# Patient Record
Sex: MALE | Race: WHITE | NOT HISPANIC OR LATINO
[De-identification: names, ages, dates, MRNs, and addresses within clinical notes are randomized per-mention and may not be internally consistent; named-entity substitution may affect disease eponyms.]

---

## 2017-03-10 ENCOUNTER — APPOINTMENT (OUTPATIENT)
Dept: SPEECH THERAPY | Facility: CLINIC | Age: 32
End: 2017-03-10

## 2017-03-10 ENCOUNTER — OUTPATIENT (OUTPATIENT)
Dept: OUTPATIENT SERVICES | Facility: HOSPITAL | Age: 32
LOS: 1 days | Discharge: HOME | End: 2017-03-10

## 2017-03-15 DIAGNOSIS — F80.1 EXPRESSIVE LANGUAGE DISORDER: ICD-10-CM

## 2017-03-21 ENCOUNTER — MEDICATION RENEWAL (OUTPATIENT)
Age: 32
End: 2017-03-21

## 2017-05-30 ENCOUNTER — APPOINTMENT (OUTPATIENT)
Dept: OTOLARYNGOLOGY | Facility: CLINIC | Age: 32
End: 2017-05-30

## 2017-05-30 DIAGNOSIS — J30.81 ALLERGIC RHINITIS DUE TO ANIMAL (CAT) (DOG) HAIR AND DANDER: ICD-10-CM

## 2017-10-24 ENCOUNTER — OUTPATIENT (OUTPATIENT)
Dept: OUTPATIENT SERVICES | Age: 32
LOS: 1 days | Discharge: HOME | End: 2017-10-24

## 2017-10-30 ENCOUNTER — APPOINTMENT (OUTPATIENT)
Dept: PEDIATRIC CARDIOLOGY | Facility: CLINIC | Age: 32
End: 2017-10-30
Payer: MEDICARE

## 2017-10-30 VITALS
SYSTOLIC BLOOD PRESSURE: 118 MMHG | BODY MASS INDEX: 25.15 KG/M2 | RESPIRATION RATE: 18 BRPM | WEIGHT: 136.69 LBS | HEIGHT: 61.81 IN | OXYGEN SATURATION: 96 % | DIASTOLIC BLOOD PRESSURE: 57 MMHG | HEART RATE: 86 BPM

## 2017-10-30 PROCEDURE — 99215 OFFICE O/P EST HI 40 MIN: CPT | Mod: 25

## 2017-10-30 PROCEDURE — 93303 ECHO TRANSTHORACIC: CPT

## 2017-10-30 PROCEDURE — 93000 ELECTROCARDIOGRAM COMPLETE: CPT

## 2017-10-30 PROCEDURE — 93320 DOPPLER ECHO COMPLETE: CPT

## 2017-10-30 PROCEDURE — 93325 DOPPLER ECHO COLOR FLOW MAPG: CPT

## 2018-03-12 ENCOUNTER — APPOINTMENT (OUTPATIENT)
Dept: SPEECH THERAPY | Facility: CLINIC | Age: 33
End: 2018-03-12

## 2018-06-12 ENCOUNTER — APPOINTMENT (OUTPATIENT)
Dept: OTOLARYNGOLOGY | Facility: CLINIC | Age: 33
End: 2018-06-12
Payer: MEDICARE

## 2018-06-12 VITALS — WEIGHT: 136 LBS | HEIGHT: 61.81 IN | BODY MASS INDEX: 25.03 KG/M2

## 2018-06-12 PROCEDURE — 99213 OFFICE O/P EST LOW 20 MIN: CPT

## 2018-06-12 RX ORDER — QUETIAPINE 50 MG/1
50 TABLET, EXTENDED RELEASE ORAL
Refills: 0 | Status: ACTIVE | COMMUNITY

## 2018-09-28 ENCOUNTER — RX RENEWAL (OUTPATIENT)
Age: 33
End: 2018-09-28

## 2018-09-29 ENCOUNTER — RX RENEWAL (OUTPATIENT)
Age: 33
End: 2018-09-29

## 2018-11-22 ENCOUNTER — RESULT CHARGE (OUTPATIENT)
Age: 33
End: 2018-11-22

## 2018-11-23 ENCOUNTER — OUTPATIENT (OUTPATIENT)
Dept: OUTPATIENT SERVICES | Age: 33
LOS: 1 days | Discharge: HOME | End: 2018-11-23

## 2018-11-26 ENCOUNTER — APPOINTMENT (OUTPATIENT)
Dept: PEDIATRIC CARDIOLOGY | Facility: CLINIC | Age: 33
End: 2018-11-26
Payer: MEDICARE

## 2018-11-26 VITALS
DIASTOLIC BLOOD PRESSURE: 80 MMHG | WEIGHT: 134.48 LBS | OXYGEN SATURATION: 97 % | HEIGHT: 61.81 IN | HEART RATE: 95 BPM | BODY MASS INDEX: 24.75 KG/M2 | RESPIRATION RATE: 22 BRPM | SYSTOLIC BLOOD PRESSURE: 125 MMHG

## 2018-11-26 DIAGNOSIS — I37.1 NONRHEUMATIC PULMONARY VALVE INSUFFICIENCY: ICD-10-CM

## 2018-11-26 DIAGNOSIS — Q21.3 TETRALOGY OF FALLOT: ICD-10-CM

## 2018-11-26 PROCEDURE — 93320 DOPPLER ECHO COMPLETE: CPT

## 2018-11-26 PROCEDURE — 93303 ECHO TRANSTHORACIC: CPT

## 2018-11-26 PROCEDURE — 93325 DOPPLER ECHO COLOR FLOW MAPG: CPT

## 2018-11-26 PROCEDURE — 93000 ELECTROCARDIOGRAM COMPLETE: CPT

## 2018-11-26 PROCEDURE — 99215 OFFICE O/P EST HI 40 MIN: CPT | Mod: 25

## 2018-11-26 RX ORDER — TRAZODONE HYDROCHLORIDE 50 MG/1
50 TABLET ORAL
Refills: 0 | Status: ACTIVE | COMMUNITY
Start: 2018-11-26

## 2018-11-26 RX ORDER — MELATONIN 3 MG
3 CAPSULE ORAL
Refills: 0 | Status: COMPLETED | COMMUNITY
End: 2018-11-26

## 2018-11-30 NOTE — PHYSICAL EXAM
[General Appearance - Alert] : alert [General Appearance - In No Acute Distress] : in no acute distress [General Appearance - Well Nourished] : well nourished [General Appearance - Well-Appearing] : well appearing [Facies] : the head and face were normal in appearance [Down Syndrome] : Down Syndrome [Sclera] : the sclera were normal [Outer Ear] : the ears and nose were normal in appearance [Respiration, Rhythm And Depth] : normal respiratory rhythm and effort [Auscultation Breath Sounds / Voice Sounds] : breath sounds clear to auscultation bilaterally [Stridor] : no stridor was observed [Normal Chest Appearance] : the chest was normal in appearance [Chest Visual Inspection Thoracic Deformity] : no chest wall deformity [Chest Surgical / Traumatic Scar] : chest incision well healed [Apical Impulse] : quiet precordium with normal apical impulse [Heart Rate And Rhythm] : normal heart rate and rhythm [Systolic] : systolic [II] : a grade 2/6 [LMSB] : LMSB  [Back] : the murmur was transmitted to the back [I] : a grade 1/6  [Rumbling] : rumbling [[place]] : the murmur was transmitted to the apex [Abdomen Soft] : soft [Nondistended] : nondistended [Abdomen Tenderness] : non-tender [Nail Clubbing] : no clubbing  or cyanosis of the fingers [Musculoskeletal - Swelling] : no joint swelling or joint tenderness [Motor Tone] : muscle strength and tone were normal [] : no rash [Demonstrated Behavior - Infant Nonreactive To Parents] : interactive [Cooperative] : uncooperative

## 2018-11-30 NOTE — CARDIOLOGY SUMMARY
[de-identified] : 11/26/18 [FreeTextEntry1] : NSR, ventricular rate 83 bpm.RSR' suggestive of right ventricular conduction delay.   [de-identified] : 11/26/18 [FreeTextEntry2] : Summary:\par  1. Tetralogy of Fallot, status post closure of anterior malalignment ventricular septal defect and right ventricular outflow tract transannular patch.\par  2. Residual right ventricular outflow tract obstruction and mild in degree.\par  3. Mildly dilated right atrium.\par  4. Mild tricuspid valve regurgitation, peak systolic instantaneous gradient 47.1 mmHg.\par  5. Moderately dilated right ventricle.\par  6. Mild global hypokinesia of the right ventricle.\par  7. Paradoxical septal motion of interventricular septum.\par  8. Normal left ventricular morphology.\par  9. Qualitatively normal left ventricular systolic function.\par 10. No pericardial effusion.

## 2018-11-30 NOTE — REASON FOR VISIT
[Follow-Up] : a follow-up visit for [Tetralogy Of Fallot] : Tetralogy of Fallot [Other: _____] : [unfilled] [Foster Parents/Guardian] : /guardian [Medical Records] : medical records [FreeTextEntry1] : s/p transanular patch, trisomy 21 [FreeTextEntry3] : S/P TOF Repair, Pulmonary Regurgitation

## 2018-11-30 NOTE — CONSULT LETTER
[Today's Date] : [unfilled] [Name] : Name: [unfilled] [] : : ~~ [Today's Date:] : [unfilled] [Dear  ___:] : Dear Dr. [unfilled]: [Consult] : I had the pleasure of evaluating your patient, [unfilled]. My full evaluation follows. [Sincerely,] : Sincerely, [Consult - Multiple Provider] : Thank you very much for allowing us to participate in the care of this patient. If you have any questions, please do not hesitate to contact us. [FreeTextEntry4] : Dr. Dante Ram [FreeTextEntry5] : [address] [facility] [FreeTextEntry6] : [place]  [address] [de-identified] : Santino KIMBROUGH\par Fellow pediatric cardiology

## 2018-11-30 NOTE — REVIEW OF SYSTEMS
[Feeling Poorly] : not feeling poorly (malaise) [Fever] : no fever [Wgt Loss (___ Lbs)] : no recent weight loss [Nasal Stuffiness] : no nasal congestion [Sore Throat] : no sore throat [Earache] : no earache [Cyanosis] : no cyanosis [Edema] : no edema [Diaphoresis] : not diaphoretic [Chest Pain] : no chest pain or discomfort [Exercise Intolerance] : no persistence of exercise intolerance [Palpitations] : no palpitations [Orthopnea] : no orthopnea [Fast HR] : no tachycardia [Tachypnea] : not tachypneic [Wheezing] : no wheezing [Cough] : no cough [Shortness Of Breath] : not expressed as feeling short of breath [Vomiting] : no vomiting [Diarrhea] : no diarrhea [Abdominal Pain] : no abdominal pain [Decrease In Appetite] : appetite not decreased [Fainting (Syncope)] : no fainting [Seizure] : no seizures [Headache] : no headache [Dizziness] : no dizziness [Limping] : no limping [Joint Pains] : no arthralgias [Joint Swelling] : no joint swelling [Rash] : no rash [Wound problems] : no wound problems [Easy Bruising] : no tendency for easy bruising [Swollen Glands] : no lymphadenopathy [Easy Bleeding] : no ~M tendency for easy bleeding [Nosebleeds] : no epistaxis [Sleep Disturbances] : ~T no sleep disturbances [Failure To Thrive] : no failure to thrive [Short Stature] : short stature was not noted

## 2018-11-30 NOTE — DISCUSSION/SUMMARY
[Needs SBE Prophylaxis] : [unfilled]  needs bacterial endocarditis prophylaxis. SBE prophylaxis is indicated for dental and invasive ENT procedures. (Circulation. 2007; 116: 6531-2382) [May participate in all age-appropriate activities] : [unfilled] May participate in all age-appropriate activities. [Influenza vaccine is recommended] : Influenza vaccine is recommended [There are no changes in medication management.] : There are no changes in medication management [FreeTextEntry1] : Satish is a 33 year old young man with Trisomy 21, severe developmental and behavioral delay with tetralogy of Fallot who has severe pulmonary regurgitation and right ventricular dilation.\par \par He was somewhat cooperative today and we were able to obtain an echocardiogram at today's visit which is essentially unchanged with severe pulmonary regurgitation and dilated RV.\par \par As we have previously documented, at this point in time, though he meets criteria for a pulmonary valved conduit placement but we think putting him through surgery and recovery, frightening to a person without his disabilities, would be inappropriate.  He will not have improved ADL with the surgery and the risk involved is not balanced by the benefit.  Thus, our impression is unchanged from last year.\par \par Our recommendation is to keep his comfortable should his heart disease worsen and that may be accomplished with medications to improve function and manage fluid overloads if he develops congestive heart failure.\par \par We have instructed his caretaker to notify us should he have any change in his exercise tolerance, appear to become short of breath or have any syncopal episodes. We requested care taker to send us the labs from [facility]. We will continue to follow him annually with an echocardiogram every other year.\par

## 2018-11-30 NOTE — HISTORY OF PRESENT ILLNESS
[FreeTextEntry1] : Satish was seen today in the Adult Congenital Heart Disease Clinic of the [facility].  He is a 33 year old, twin , Trisomy 21 with tetralogy of Fallot who underwent a repair and now has severe pulmonary regurgitation with significant right ventricular dilation and MRI proven RV volume of 150 cc/m 2 in .  He is functionally unchanged, with no history of SOB/ROCA. He participates in OT and PT at his facility. His caretaker who accompanied him today reports that he is doing well and continues to participate in activities in his residence.\par \par He is very low functioning and non verbal here at today's visit. He is somewhat cooperative and non violent during his visits but will not tolerate much in terms of testing. He does have routine dental visits and recently had a cleaning.\par \par He resides in a facility and receives visits from his mother and brother every other weekend. His father is . He also has a sister with trisomy 21 who resides in a separate facility. The family is remains engaged in his health care.  He is here today with a counselor  from the facility he lives in and who knows him well.  He is described as sweet and somewhat communicative when not in the presence of doctors and also quite gentle.

## 2019-02-07 ENCOUNTER — APPOINTMENT (OUTPATIENT)
Dept: OTOLARYNGOLOGY | Facility: CLINIC | Age: 34
End: 2019-02-07
Payer: MEDICARE

## 2019-02-07 VITALS
SYSTOLIC BLOOD PRESSURE: 130 MMHG | WEIGHT: 135 LBS | HEART RATE: 88 BPM | BODY MASS INDEX: 24.84 KG/M2 | DIASTOLIC BLOOD PRESSURE: 74 MMHG | HEIGHT: 61.8 IN

## 2019-02-07 DIAGNOSIS — Z86.69 PERSONAL HISTORY OF OTHER DISEASES OF THE NERVOUS SYSTEM AND SENSE ORGANS: ICD-10-CM

## 2019-02-07 PROCEDURE — 99212 OFFICE O/P EST SF 10 MIN: CPT

## 2019-02-07 NOTE — PHYSICAL EXAM
[FreeTextEntry1] : uncooperative [Normal] : no mass and no adenopathy [de-identified] : no acute infection, narrow ear canals, tms only partially seen [de-identified] : dry and crusted anteriorly

## 2019-02-07 NOTE — REASON FOR VISIT
[Subsequent Evaluation] : a subsequent evaluation for [FreeTextEntry2] : 6 month f/u for nasal congestion

## 2019-02-07 NOTE — HISTORY OF PRESENT ILLNESS
[de-identified] : 33 year old male  follow up visit for annual exam, nasal congestion.   From group home.hx of previous tubes and perforation. uncooperative for exam

## 2019-08-02 ENCOUNTER — RX RENEWAL (OUTPATIENT)
Age: 34
End: 2019-08-02

## 2019-11-01 ENCOUNTER — OUTPATIENT (OUTPATIENT)
Dept: OUTPATIENT SERVICES | Age: 34
LOS: 1 days | Discharge: HOME | End: 2019-11-01

## 2019-11-04 ENCOUNTER — APPOINTMENT (OUTPATIENT)
Dept: PEDIATRIC CARDIOLOGY | Facility: CLINIC | Age: 34
End: 2019-11-04

## 2019-11-04 ENCOUNTER — APPOINTMENT (OUTPATIENT)
Age: 34
End: 2019-11-04

## 2019-11-06 ENCOUNTER — APPOINTMENT (OUTPATIENT)
Dept: PEDIATRIC CARDIOLOGY | Facility: CLINIC | Age: 34
End: 2019-11-06
Payer: MEDICARE

## 2019-11-06 VITALS
SYSTOLIC BLOOD PRESSURE: 112 MMHG | WEIGHT: 131.18 LBS | HEART RATE: 79 BPM | BODY MASS INDEX: 24.77 KG/M2 | DIASTOLIC BLOOD PRESSURE: 69 MMHG | OXYGEN SATURATION: 97 % | HEIGHT: 61.02 IN

## 2019-11-06 PROCEDURE — 93303 ECHO TRANSTHORACIC: CPT

## 2019-11-06 PROCEDURE — 93325 DOPPLER ECHO COLOR FLOW MAPG: CPT

## 2019-11-06 PROCEDURE — 93000 ELECTROCARDIOGRAM COMPLETE: CPT

## 2019-11-06 PROCEDURE — 99214 OFFICE O/P EST MOD 30 MIN: CPT | Mod: 25

## 2019-11-06 PROCEDURE — 93320 DOPPLER ECHO COMPLETE: CPT

## 2019-11-06 NOTE — REASON FOR VISIT
[Follow-Up] : a follow-up visit for [Tetralogy Of Fallot] : Tetralogy of Fallot [Pulmonary Valve Insufficiency] : pulmonary valve insufficiency [Foster Parents/Guardian] : /guardian [Medical Records] : medical records

## 2019-11-08 NOTE — DISCUSSION/SUMMARY
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  He has no functional limitations and by history, has not had any significant clinical change since his last evaluation last year.  I would agree that a referral for pulmonary valve replacement would not award Satish with any significant improvement in quality of life.  As discussed previously, I think a more appropriate course would be medical management of ventricular dysfunction and/or arrhythmias.  SBE prophylaxis is reasonable given his delay and challenges with excellent dental hygiene. \par \par Follow up in one year, sooner if any clinical concerns arise.

## 2019-11-08 NOTE — HISTORY OF PRESENT ILLNESS
[FreeTextEntry1] : I had the pleasure of seeing Satish Sanders in the Adult Congenital Heart Program of [facility] on November 6, 2019 for a follow up evaluation.  As you know, Satish is a 34 year old male with severe developmental delay related to Trisomy 21.  He was born with tetralogy of Fallot and underwent a transannular patch repair in infancy.  He has been followed serially for resultant free pulmonary insufficiency and right ventricular dilation.  A cardiac MRI from 2013 revealed an RVEF of 46% and RVEDVi of 150 ml/m2.  Intervention for pulmonary valve replacement has not been considered given the lack of symptoms and his considerable delay.  He was last seen by my partner, Dr. Hallman, last year at which time he was doing well.\par \par In the interim, Satish continues to do well.  Two aides from his home joined him for the visit today.  They state that Satish has had no change in energy level or level of participation in program.  He goes to program 5 days per week and goes home with his family every other weekend.  He has not had episodes of fast or heavy breathing, fast heart rate, near syncope, or syncope.

## 2019-11-08 NOTE — PHYSICAL EXAM
[General Appearance - Alert] : alert [General Appearance - In No Acute Distress] : in no acute distress [Down Syndrome] : Down Syndrome [Sclera] : the conjunctiva were normal [Examination Of The Oral Cavity] : mucous membranes were moist and pink [Respiration, Rhythm And Depth] : normal respiratory rhythm and effort [Auscultation Breath Sounds / Voice Sounds] : breath sounds clear to auscultation bilaterally [No Cough] : no cough [Sternotomy] : sternotomy [Well-Healed] : well-healed [Apical Impulse] : quiet precordium with normal apical impulse [Heart Rate And Rhythm] : normal heart rate and rhythm [Heart Sounds] : normal S1 and S2 [Heart Sounds Gallop] : no gallops [Heart Sounds Pericardial Friction Rub] : no pericardial rub [Arterial Pulses] : normal upper and lower extremity pulses with no pulse delay [Edema] : no edema [Capillary Refill Test] : normal capillary refill [Systolic] : systolic [II] : a grade 2/6 [LUSB] : LUSB [Ejection] : ejection [Diastolic] : diastolic [I] : a grade 1/4  [LMSB] : LMSB  [Abdomen Soft] : soft [Nondistended] : nondistended [Abdomen Tenderness] : non-tender [] : no hepato-splenomegaly [Musculoskeletal - Swelling] : no joint swelling seen [Nail Clubbing] : no clubbing  or cyanosis of the fingers [Skin Color & Pigmentation] : normal skin color and pigmentation [Demonstrated Behavior - Infant Nonreactive To Parents] : interactive [Mood] : mood and affect were appropriate for age [FreeTextEntry1] : rocking in seat, making noises

## 2019-11-08 NOTE — CONSULT LETTER
[Today's Date] : [unfilled] [Name] : Name: [unfilled] [] : : ~~ [Today's Date:] : [unfilled] [Dear  ___:] : Dear Dr. [unfilled]: [Consult] : I had the pleasure of evaluating your patient, [unfilled]. My full evaluation follows. [Consult - Multiple Provider] : Thank you very much for allowing us to participate in the care of this patient. If you have any questions, please do not hesitate to contact us. [Sincerely,] : Sincerely, [FreeTextEntry4] : Dr. Dante Ram [FreeTextEntry5] : [address] [facility] [FreeTextEntry6] : [place]  [address] [FreeTextEntry8] : [phone] [de-identified] : Lobo Monahan MD\par Pediatric Cardiology\par Adult Congenital Heart Disease\par  of Pediatrics\par [employer] [facility]

## 2019-11-08 NOTE — REVIEW OF SYSTEMS
[Fever] : no fever [Cyanosis] : no cyanosis [Change in Vision] : no change in vision [Exercise Intolerance] : no persistence of exercise intolerance [Orthopnea] : no orthopnea [Diaphoresis] : not diaphoretic [Edema] : no edema [Fast HR] : no tachycardia [Tachypnea] : not tachypneic [Fainting (Syncope)] : no fainting [Shortness Of Breath] : not expressed as feeling short of breath [Dizziness] : no dizziness [Rash] : no rash [Dec Urine Output] : no oliguria [Easy Bleeding] : no ~M tendency for easy bleeding

## 2019-11-08 NOTE — CARDIOLOGY SUMMARY
[Today's Date] : [unfilled] [FreeTextEntry1] : normal sinus rhythm\par right bundle branch block (QRSd 120 ms)\par non specific T wave abnormality [FreeTextEntry2] : free PI\par RV dilation, mildly decreased fxn\par preserved LV function\par qualitatively concentric LVH

## 2019-11-12 ENCOUNTER — APPOINTMENT (OUTPATIENT)
Dept: OTOLARYNGOLOGY | Facility: CLINIC | Age: 34
End: 2019-11-12
Payer: MEDICARE

## 2019-11-12 DIAGNOSIS — R62.0 DELAYED MILESTONE IN CHILDHOOD: ICD-10-CM

## 2019-11-12 PROCEDURE — 99212 OFFICE O/P EST SF 10 MIN: CPT

## 2019-11-12 RX ORDER — FLUTICASONE PROPIONATE 50 UG/1
50 SPRAY, METERED NASAL DAILY
Qty: 1 | Refills: 5 | Status: DISCONTINUED | COMMUNITY
Start: 2018-06-12 | End: 2019-11-12

## 2019-11-12 NOTE — REASON FOR VISIT
[Subsequent Evaluation] : a subsequent evaluation for [Formal Caregiver] : formal caregiver [FreeTextEntry2] : follow up for sinus check up, history of MR and ear infections, ear perforation and ear tubes, patient from group home.

## 2020-03-03 ENCOUNTER — APPOINTMENT (OUTPATIENT)
Dept: OTOLARYNGOLOGY | Facility: CLINIC | Age: 35
End: 2020-03-03

## 2020-05-27 ENCOUNTER — APPOINTMENT (OUTPATIENT)
Dept: PEDIATRIC ALLERGY IMMUNOLOGY | Facility: CLINIC | Age: 35
End: 2020-05-27

## 2020-06-11 ENCOUNTER — APPOINTMENT (OUTPATIENT)
Dept: OTOLARYNGOLOGY | Facility: CLINIC | Age: 35
End: 2020-06-11
Payer: COMMERCIAL

## 2020-06-11 DIAGNOSIS — F91.9 CONDUCT DISORDER, UNSPECIFIED: ICD-10-CM

## 2020-06-11 DIAGNOSIS — J31.0 CHRONIC RHINITIS: ICD-10-CM

## 2020-06-11 PROCEDURE — 99213 OFFICE O/P EST LOW 20 MIN: CPT | Mod: 95

## 2020-06-11 NOTE — HISTORY OF PRESENT ILLNESS
[Home] : at home, [unfilled] , at the time of the visit. [de-identified] : AUGUSTINE СВЕТЛАНАCARTER here for follow up for sinus check up, history of MR and ear infections, ear perforation and ear tubes, patient from group home.  Uncooperative for past examinations.  At this time he has no complaints at all has been using saline sprays in his nose which has been so shown to keep his nose clear there is no drainage from his ears.\par  [Medical Office: ([facility])___] : at the medical office located in

## 2020-08-26 ENCOUNTER — RESULT CHARGE (OUTPATIENT)
Age: 35
End: 2020-08-26

## 2020-08-31 ENCOUNTER — APPOINTMENT (OUTPATIENT)
Dept: PEDIATRIC CARDIOLOGY | Facility: CLINIC | Age: 35
End: 2020-08-31
Payer: COMMERCIAL

## 2020-08-31 VITALS
SYSTOLIC BLOOD PRESSURE: 99 MMHG | HEART RATE: 60 BPM | OXYGEN SATURATION: 98 % | HEIGHT: 61.42 IN | DIASTOLIC BLOOD PRESSURE: 57 MMHG | RESPIRATION RATE: 18 BRPM | WEIGHT: 110.23 LBS | BODY MASS INDEX: 20.55 KG/M2

## 2020-08-31 PROCEDURE — 93320 DOPPLER ECHO COMPLETE: CPT

## 2020-08-31 PROCEDURE — 99214 OFFICE O/P EST MOD 30 MIN: CPT | Mod: 25

## 2020-08-31 PROCEDURE — 93325 DOPPLER ECHO COLOR FLOW MAPG: CPT

## 2020-08-31 PROCEDURE — 93303 ECHO TRANSTHORACIC: CPT

## 2020-08-31 PROCEDURE — 93000 ELECTROCARDIOGRAM COMPLETE: CPT

## 2020-08-31 NOTE — REASON FOR VISIT
[Follow-Up] : a follow-up visit for [Tetralogy Of Fallot] : Tetralogy of Fallot [Trisomy 21 (Down Syndrome)] : Trisomy 21  [Other: _____] : [unfilled]

## 2020-09-01 NOTE — CARDIOLOGY SUMMARY
[de-identified] : 8/31/2020 [FreeTextEntry1] : sinus rhythm with short AK interval\par right bundle branch block (QRSd 122 ms) [de-identified] : 8/31/2020 [FreeTextEntry2] : . Tetralogy of Fallot, status post closure of anterior malalignment ventricular septal defect and right\par ventricular outflow tract transannular patch.\par 2. Mildly dilated right atrium.\par 3. Moderately dilated right ventricle.\par 4. Mild global hypokinesia of the right ventricle.\par 5. The LPA appears smaller and there is acceleration of flow, peak 32mmHg.\par 6. Paradoxical septal motion of interventricular septum.\par 7. Residual right ventricular outflow tract obstruction and mild in degree.\par 8. Normal left ventricular morphology.\par 9. Qualitatively normal left ventricular systolic function.\par 10. No pericardial effusion.

## 2020-09-01 NOTE — REVIEW OF SYSTEMS
[Wgt Loss (___ Lbs)] : recent [unfilled] lb weight loss [Change in Vision] : no change in vision [Cyanosis] : no cyanosis [Edema] : no edema [Diaphoresis] : not diaphoretic [Exercise Intolerance] : no persistence of exercise intolerance [Orthopnea] : no orthopnea [Fast HR] : no tachycardia [Tachypnea] : not tachypneic [Shortness Of Breath] : not expressed as feeling short of breath [Fainting (Syncope)] : no fainting [Seizure] : no seizures [Sleep Disturbances] : ~T sleep disturbances [Failure To Thrive] : no failure to thrive [Dec Urine Output] : no oliguria

## 2020-09-01 NOTE — HISTORY OF PRESENT ILLNESS
[FreeTextEntry1] : We had the pleasure of seeing Satish Sanders in the Adult Congenital Heart Program of [facility] on August 31, 2020 for follow up.  As you know, he is a 35 year old male with severe developmental delay due to Trisomy 21 and tetralogy of Fallot status post transannular patch repair in infancy.  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  His last cardiac MRI revealed an RVEF of 46%, RVEDV of 318 ml, and RVEDVi of 191 ml/m2.  Prior discussions with Satish's family have been had that the benefits of a pulmonary valve replacement would not really translate to an improvement in quality of life given his profound delay.  Medical management of chronic sequelae has been the goal of treatment.\par \par Satish comes today with an aid from the house. He is familiar with Satish and has been working with Satish for at least 7 years.  In the last year, there has been no change in Satish's work of breathing, energy level, or behavior.  With COVID, the home restricted visitors from coming in and did not allow residents to go home.  Due to a more consistent diet and less opportunities for food from the outside, many residents lost weight, including Satish.  Over the last few weeks, the restrictions have been lifted. Satish's mother has voiced that he struggles with day-night patterns when at home. \par \par He has not had fast or heavy breathing. He has not had syncope. He is nonverbal and requires full assistance with ADLs.

## 2020-09-01 NOTE — CONSULT LETTER
[Name] : Name: [unfilled] [Today's Date] : [unfilled] [] : : ~~ [Today's Date:] : [unfilled] [Dear  ___:] : Dear Dr. [unfilled]: [Consult] : I had the pleasure of evaluating your patient, [unfilled]. My full evaluation follows. [Consult - Multiple Provider] : Thank you very much for allowing us to participate in the care of this patient. If you have any questions, please do not hesitate to contact us. [Sincerely,] : Sincerely, [FreeTextEntry4] : Dr. Dante Ram [FreeTextEntry5] : [address] [facility] [FreeTextEntry6] : [place]  [address] [FreeTextEntry8] : [phone] [de-identified] : Ally Lorenzo, MSN, CPNP-AC, PC\par Pediatric Cardiology, Adult Congenital Cardiology\par [facility]\par \par Lobo Monahan MD\par Pediatric Cardiology\par Adult Congenital Heart Disease\par  of Pediatrics\par [employer] [facility]

## 2020-09-01 NOTE — PHYSICAL EXAM
[General Appearance - Alert] : alert [General Appearance - In No Acute Distress] : in no acute distress [General Appearance - Well Developed] : well developed [Down Syndrome] : Down Syndrome [Sclera] : the conjunctiva were normal [Examination Of The Oral Cavity] : mucous membranes were moist and pink [] : no respiratory distress [Chest Surgical / Traumatic Scar] : chest incision well healed [Apical Impulse] : quiet precordium with normal apical impulse [Heart Rate And Rhythm] : normal heart rate and rhythm [Heart Sounds] : normal S1 and S2 [Heart Sounds Gallop] : no gallops [Heart Sounds Pericardial Friction Rub] : no pericardial rub [Heart Sounds Click] : no clicks [Arterial Pulses] : normal upper and lower extremity pulses with no pulse delay [Edema] : no edema [Capillary Refill Test] : normal capillary refill [Abdomen Soft] : soft [Nondistended] : nondistended [Abdomen Tenderness] : non-tender [Nail Clubbing] : no clubbing  or cyanosis of the fingernails [FreeTextEntry1] : seated in chair, cooperative for examination, playing with infant teether

## 2020-09-01 NOTE — DISCUSSION/SUMMARY
[Needs SBE Prophylaxis] : [unfilled]  needs bacterial endocarditis prophylaxis. SBE prophylaxis is indicated for dental and invasive ENT procedures. (Circulation. 2007; 116: 9534-9235) [Influenza vaccine is recommended] : Influenza vaccine is recommended [FreeTextEntry1] : In summary, Satish is a 35 year old male with severe developmental delay related to Trisomy 21 and a history of repaired tetralogy of Fallot with resultant free pulmonary insufficiency and right ventricular dilation.  His interim course is unchanged barring from weight loss related to a more regular dietary intake.  I continue to agree that pulmonary valve replacement would not award Satish with any significant improvement in quality of life.  As discussed previously, I think a more appropriate course would be medical management of symptoms related to ventricular dysfunction and/or arrhythmias.  SBE prophylaxis is reasonable given his delay and challenges with excellent dental hygiene. \par \par Follow up in one year, sooner if any clinical concerns arise.

## 2020-11-09 ENCOUNTER — APPOINTMENT (OUTPATIENT)
Dept: PEDIATRIC CARDIOLOGY | Facility: CLINIC | Age: 35
End: 2020-11-09

## 2021-02-04 ENCOUNTER — TRANSCRIPTION ENCOUNTER (OUTPATIENT)
Age: 36
End: 2021-02-04

## 2021-05-06 ENCOUNTER — APPOINTMENT (OUTPATIENT)
Dept: OTOLARYNGOLOGY | Facility: CLINIC | Age: 36
End: 2021-05-06

## 2021-06-15 ENCOUNTER — APPOINTMENT (OUTPATIENT)
Dept: OTOLARYNGOLOGY | Facility: CLINIC | Age: 36
End: 2021-06-15

## 2021-08-13 ENCOUNTER — APPOINTMENT (OUTPATIENT)
Dept: PEDIATRIC CARDIOLOGY | Facility: CLINIC | Age: 36
End: 2021-08-13

## 2021-08-30 ENCOUNTER — APPOINTMENT (OUTPATIENT)
Dept: PEDIATRIC CARDIOLOGY | Facility: CLINIC | Age: 36
End: 2021-08-30

## 2021-09-08 ENCOUNTER — APPOINTMENT (OUTPATIENT)
Dept: PEDIATRIC CARDIOLOGY | Facility: CLINIC | Age: 36
End: 2021-09-08
Payer: COMMERCIAL

## 2021-09-08 VITALS
BODY MASS INDEX: 23.05 KG/M2 | HEIGHT: 61.42 IN | DIASTOLIC BLOOD PRESSURE: 76 MMHG | WEIGHT: 123.68 LBS | HEART RATE: 71 BPM | SYSTOLIC BLOOD PRESSURE: 110 MMHG | OXYGEN SATURATION: 100 %

## 2021-09-08 PROCEDURE — 99214 OFFICE O/P EST MOD 30 MIN: CPT

## 2021-09-08 PROCEDURE — 93000 ELECTROCARDIOGRAM COMPLETE: CPT

## 2021-09-08 PROCEDURE — 99072 ADDL SUPL MATRL&STAF TM PHE: CPT

## 2021-09-08 RX ORDER — MENTHOL
40 GEL (GRAM) TOPICAL
Refills: 0 | Status: DISCONTINUED | COMMUNITY
End: 2021-09-08

## 2021-09-08 RX ORDER — BACILLUS COAGULANS 1B CELL
CAPSULE ORAL
Refills: 0 | Status: DISCONTINUED | COMMUNITY
End: 2021-09-08

## 2021-09-08 RX ORDER — ACETAMINOPHEN 650 MG/1
SUPPOSITORY RECTAL
Refills: 0 | Status: DISCONTINUED | COMMUNITY
End: 2021-09-08

## 2021-09-08 RX ORDER — OMEGA-3S/DHA/EPA/FISH OIL 300-1000MG
CAPSULE ORAL
Refills: 0 | Status: DISCONTINUED | COMMUNITY
End: 2021-09-08

## 2021-09-08 RX ORDER — GLYCERIN 2 G/1
SUPPOSITORY RECTAL
Refills: 0 | Status: DISCONTINUED | COMMUNITY
End: 2021-09-08

## 2021-09-08 RX ORDER — QUETIAPINE 100 MG/1
100 TABLET, FILM COATED ORAL
Refills: 0 | Status: ACTIVE | COMMUNITY

## 2021-09-08 RX ORDER — LORATADINE 10 MG/1
10 TABLET ORAL
Refills: 0 | Status: ACTIVE | COMMUNITY

## 2021-09-08 RX ORDER — FLUTICASONE PROPIONATE 50 UG/1
50 SPRAY, METERED NASAL
Qty: 1 | Refills: 5 | Status: DISCONTINUED | COMMUNITY
Start: 2019-08-02 | End: 2021-09-08

## 2021-09-08 RX ORDER — CLINDAMYCIN HYDROCHLORIDE 300 MG/1
300 CAPSULE ORAL
Qty: 4 | Refills: 1 | Status: DISCONTINUED | COMMUNITY
Start: 2018-11-26 | End: 2021-09-08

## 2021-09-08 RX ORDER — WHITE PETROLATUM 1.75 OZ
OINTMENT TOPICAL
Refills: 0 | Status: DISCONTINUED | COMMUNITY
End: 2021-09-08

## 2021-09-08 RX ORDER — OMEGA-3-ACID ETHYL ESTERS 1 G/1
1 CAPSULE, LIQUID FILLED ORAL
Refills: 0 | Status: DISCONTINUED | COMMUNITY
End: 2021-09-08

## 2021-09-15 ENCOUNTER — APPOINTMENT (OUTPATIENT)
Dept: PEDIATRIC CARDIOLOGY | Facility: CLINIC | Age: 36
End: 2021-09-15

## 2021-09-21 ENCOUNTER — OUTPATIENT (OUTPATIENT)
Dept: OUTPATIENT SERVICES | Facility: HOSPITAL | Age: 36
LOS: 1 days | End: 2021-09-21
Payer: MEDICARE

## 2021-09-21 VITALS
HEART RATE: 62 BPM | WEIGHT: 123.02 LBS | DIASTOLIC BLOOD PRESSURE: 85 MMHG | RESPIRATION RATE: 20 BRPM | OXYGEN SATURATION: 99 % | SYSTOLIC BLOOD PRESSURE: 126 MMHG | TEMPERATURE: 98 F | HEIGHT: 63 IN

## 2021-09-21 DIAGNOSIS — Z01.818 ENCOUNTER FOR OTHER PREPROCEDURAL EXAMINATION: ICD-10-CM

## 2021-09-21 DIAGNOSIS — K02.62 DENTAL CARIES ON SMOOTH SURFACE PENETRATING INTO DENTIN: ICD-10-CM

## 2021-09-21 DIAGNOSIS — K05.6 PERIODONTAL DISEASE, UNSPECIFIED: ICD-10-CM

## 2021-09-21 LAB
ANION GAP SERPL CALC-SCNC: 13 MMOL/L — SIGNIFICANT CHANGE UP (ref 5–17)
BUN SERPL-MCNC: 18 MG/DL — SIGNIFICANT CHANGE UP (ref 7–23)
CALCIUM SERPL-MCNC: 9.1 MG/DL — SIGNIFICANT CHANGE UP (ref 8.4–10.5)
CHLORIDE SERPL-SCNC: 103 MMOL/L — SIGNIFICANT CHANGE UP (ref 96–108)
CO2 SERPL-SCNC: 25 MMOL/L — SIGNIFICANT CHANGE UP (ref 22–31)
CREAT SERPL-MCNC: 0.82 MG/DL — SIGNIFICANT CHANGE UP (ref 0.5–1.3)
GLUCOSE SERPL-MCNC: 89 MG/DL — SIGNIFICANT CHANGE UP (ref 70–99)
HCT VFR BLD CALC: 42.7 % — SIGNIFICANT CHANGE UP (ref 39–50)
HGB BLD-MCNC: 14.3 G/DL — SIGNIFICANT CHANGE UP (ref 13–17)
MCHC RBC-ENTMCNC: 31.9 PG — SIGNIFICANT CHANGE UP (ref 27–34)
MCHC RBC-ENTMCNC: 33.5 GM/DL — SIGNIFICANT CHANGE UP (ref 32–36)
MCV RBC AUTO: 95.3 FL — SIGNIFICANT CHANGE UP (ref 80–100)
NRBC # BLD: 0 /100 WBCS — SIGNIFICANT CHANGE UP (ref 0–0)
PLATELET # BLD AUTO: 228 K/UL — SIGNIFICANT CHANGE UP (ref 150–400)
POTASSIUM SERPL-MCNC: 4.1 MMOL/L — SIGNIFICANT CHANGE UP (ref 3.5–5.3)
POTASSIUM SERPL-SCNC: 4.1 MMOL/L — SIGNIFICANT CHANGE UP (ref 3.5–5.3)
RBC # BLD: 4.48 M/UL — SIGNIFICANT CHANGE UP (ref 4.2–5.8)
RBC # FLD: 13.6 % — SIGNIFICANT CHANGE UP (ref 10.3–14.5)
SODIUM SERPL-SCNC: 141 MMOL/L — SIGNIFICANT CHANGE UP (ref 135–145)
WBC # BLD: 4.08 K/UL — SIGNIFICANT CHANGE UP (ref 3.8–10.5)
WBC # FLD AUTO: 4.08 K/UL — SIGNIFICANT CHANGE UP (ref 3.8–10.5)

## 2021-09-21 PROCEDURE — 85027 COMPLETE CBC AUTOMATED: CPT

## 2021-09-21 PROCEDURE — G0463: CPT

## 2021-09-21 PROCEDURE — 80048 BASIC METABOLIC PNL TOTAL CA: CPT

## 2021-09-21 NOTE — REVIEW OF SYSTEMS
[Short Stature] : short stature was noted [Feeling Poorly] : not feeling poorly (malaise) [Fever] : no fever [Cyanosis] : no cyanosis [Diaphoresis] : not diaphoretic [Exercise Intolerance] : no persistence of exercise intolerance [Cough] : no cough [Fainting (Syncope)] : no fainting [Seizure] : no seizures [Easy Bruising] : no tendency for easy bruising [Easy Bleeding] : no ~M tendency for easy bleeding [Sleep Disturbances] : ~T no sleep disturbances [Heat/Cold Intolerance] : no temperature intolerance

## 2021-09-21 NOTE — H&P PST ADULT - OTHER CARE PROVIDERS
Pt complains of productive cough sinus congestion headache x 3 days. States she has treated with mucinex and clariton. States PCP has been unable to fit her in to schedule in that time. cardiology Dr Hallman

## 2021-09-21 NOTE — H&P PST ADULT - HISTORY OF PRESENT ILLNESS
36 yr old male accompanied by Group home nurse  with PMH of  h/o ADHD, Autism, MR, s/p closure of Anterior malalignment ventricular septal defect and right ventricular septal defect followed by cardiology ( Dr paz ) . Presents to PST for scheduled Comprehensive Dental Treatment on 10/1/21.    covid test 9/28/21   medical eval

## 2021-09-21 NOTE — H&P PST ADULT - NSICDXPASTMEDICALHX_GEN_ALL_CORE_FT
PAST MEDICAL HISTORY:  Affective Bipolar Disorder     Down Syndrome     GERD (gastroesophageal reflux disease)     History of Hypothyroidism     HLD (hyperlipidemia)

## 2021-09-21 NOTE — DISCUSSION/SUMMARY
[Needs SBE Prophylaxis] : [unfilled]  needs bacterial endocarditis prophylaxis. SBE prophylaxis is indicated for dental and invasive ENT procedures. (Circulation. 2007; 116: 1274-3874) [FreeTextEntry1] : In summary, Satish is a 36 year old male with severe developmental delay related to Trisomy 21 and a history of repaired tetralogy of Fallot with resultant free pulmonary insufficiency and right ventricular dilation. His interim course is unchanged. We deferred an echocardiogram today as he is uncooperative and there is no change in his reported work of breathing. We did manage a very brief exam though Satish was moaning making it difficult to hear. It appeared unchanged from his prior exam last year.\par \par We continue to agree that pulmonary valve replacement would not award Satish with any significant improvement in quality of life. As discussed previously, we think a more appropriate course would be medical management of symptoms related to ventricular dysfunction and/or arrhythmias. We have instructed his caretaker to notify us of any change in his daily breathing pattern or any syncopal event. SBE prophylaxis is reasonable given his delay and challenges with excellent dental hygiene.

## 2021-09-21 NOTE — CARDIOLOGY SUMMARY
[Today's Date] : [unfilled] [FreeTextEntry1] : Excessive baseline artifact\par Normal sinus rhythm\par Complete right bundle branch block

## 2021-09-21 NOTE — CONSULT LETTER
[Today's Date] : [unfilled] [Name] : Name: [unfilled] [] : : ~~ [Today's Date:] : [unfilled] [Dear  ___:] : Dear Dr. [unfilled]: [Consult] : I had the pleasure of evaluating your patient, [unfilled]. My full evaluation follows. [Sincerely,] : Sincerely, [___] : [unfilled] [Consult - Multiple Provider] : Thank you very much for allowing us to participate in the care of this patient. If you have any questions, please do not hesitate to contact us. [FreeTextEntry4] : Pari rosas MD [FreeTextEntry5] : [address] [FreeTextEntry6] : [place] [address]

## 2021-09-21 NOTE — HISTORY OF PRESENT ILLNESS
[FreeTextEntry1] : We had the pleasure of seeing Satish Sanders in the [facility] of [facility] on September 8, 2021 for follow up. As you know, he is a 36 year old male with severe developmental delay due to Trisomy 21 and tetralogy of Fallot status post transannular patch repair in infancy. 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 His last cardiac MRI in 2013 revealed an RVEF of 46%, RVEDV of 318 ml, and RVEDVi of 191 ml/m2. Prior discussions with Satish's family have been had that the benefits of a pulmonary valve replacement would not really translate to an improvement in quality of life given his profound delay. Medical management of chronic sequelae has been the goal of treatment.\par \par Satish comes today with a a new aide from his residential facility. Satish is agitated and uncooperative today. In the last year, there has been no change in Satish's work of breathing or energy level. \par \par He has not had fast or heavy breathing. He has not had syncope. He is nonverbal and requires full assistance with ADLs. \par

## 2021-09-21 NOTE — PHYSICAL EXAM
[General Appearance - Alert] : alert [Down Syndrome] : Down Syndrome [Sclera] : the sclera were normal [Examination Of The Oral Cavity] : mucous membranes were moist and pink [Auscultation Breath Sounds / Voice Sounds] : breath sounds clear to auscultation bilaterally [Chest Surgical / Traumatic Scar] : chest incision well healed [Apical Impulse] : quiet precordium with normal apical impulse [Heart Rate And Rhythm] : normal heart rate and rhythm [Heart Sounds] : normal S1 and S2 [Edema] : no edema [Systolic] : systolic [II] : a grade 2/6 [LUSB] : LUSB [Abdomen Soft] : soft [Nail Clubbing] : no clubbing  or cyanosis of the fingers [Skin Color & Pigmentation] : normal skin color and pigmentation [FreeTextEntry1] : seated in chair, cooperative but moaning during exam making it difficult to auscultate

## 2021-09-28 ENCOUNTER — OUTPATIENT (OUTPATIENT)
Dept: OUTPATIENT SERVICES | Facility: HOSPITAL | Age: 36
LOS: 1 days | End: 2021-09-28
Payer: MEDICARE

## 2021-09-28 DIAGNOSIS — Z11.52 ENCOUNTER FOR SCREENING FOR COVID-19: ICD-10-CM

## 2021-09-28 LAB — SARS-COV-2 RNA SPEC QL NAA+PROBE: SIGNIFICANT CHANGE UP

## 2021-09-28 PROCEDURE — U0003: CPT

## 2021-09-28 PROCEDURE — C9803: CPT

## 2021-09-28 PROCEDURE — U0005: CPT

## 2021-09-30 ENCOUNTER — TRANSCRIPTION ENCOUNTER (OUTPATIENT)
Age: 36
End: 2021-09-30

## 2021-09-30 NOTE — PRE-ANESTHESIA EVALUATION ADULT - NSANTHPMHFT_GEN_ALL_CORE
Hx of tetralogy of fallot and VSD s/p repair as a child. No cardiac issues currently. Also no respiratory issues, such as asthma.

## 2021-09-30 NOTE — ASU DISCHARGE PLAN (ADULT/PEDIATRIC) - ASU DC SPECIAL INSTRUCTIONSFT
comprehensive dental treatment under [facility]    see Dr Aguiar in one week [phone]    return to routine activities on Sunday, resume all medications comprehensive dental treatment under [facility]    see Dr Aguiar in one week [phone]    return to routine activities on Sunday, resume all medications    no extractions performed

## 2021-10-01 ENCOUNTER — OUTPATIENT (OUTPATIENT)
Dept: OUTPATIENT SERVICES | Facility: HOSPITAL | Age: 36
LOS: 1 days | End: 2021-10-01
Payer: MEDICARE

## 2021-10-01 ENCOUNTER — TRANSCRIPTION ENCOUNTER (OUTPATIENT)
Age: 36
End: 2021-10-01

## 2021-10-01 VITALS
DIASTOLIC BLOOD PRESSURE: 83 MMHG | HEART RATE: 66 BPM | RESPIRATION RATE: 16 BRPM | OXYGEN SATURATION: 97 % | SYSTOLIC BLOOD PRESSURE: 124 MMHG

## 2021-10-01 VITALS
HEIGHT: 63 IN | WEIGHT: 123.02 LBS | SYSTOLIC BLOOD PRESSURE: 111 MMHG | DIASTOLIC BLOOD PRESSURE: 64 MMHG | RESPIRATION RATE: 18 BRPM | HEART RATE: 70 BPM | OXYGEN SATURATION: 97 % | TEMPERATURE: 97 F

## 2021-10-01 DIAGNOSIS — K02.62 DENTAL CARIES ON SMOOTH SURFACE PENETRATING INTO DENTIN: ICD-10-CM

## 2021-10-01 DIAGNOSIS — K05.6 PERIODONTAL DISEASE, UNSPECIFIED: ICD-10-CM

## 2021-10-01 PROCEDURE — 41820 EXCISION GUM EACH QUADRANT: CPT

## 2021-10-01 PROCEDURE — D1206: CPT

## 2021-10-01 PROCEDURE — C9399: CPT

## 2021-10-01 PROCEDURE — D4341: CPT

## 2021-10-01 RX ORDER — SODIUM CHLORIDE 9 MG/ML
3 INJECTION INTRAMUSCULAR; INTRAVENOUS; SUBCUTANEOUS EVERY 8 HOURS
Refills: 0 | Status: DISCONTINUED | OUTPATIENT
Start: 2021-10-01 | End: 2021-10-01

## 2021-10-01 RX ORDER — LIDOCAINE HCL 20 MG/ML
0.2 VIAL (ML) INJECTION ONCE
Refills: 0 | Status: DISCONTINUED | OUTPATIENT
Start: 2021-10-01 | End: 2021-10-01

## 2021-10-01 RX ORDER — INFLUENZA VIRUS VACCINE 15; 15; 15; 15 UG/.5ML; UG/.5ML; UG/.5ML; UG/.5ML
0.5 SUSPENSION INTRAMUSCULAR ONCE
Refills: 0 | Status: DISCONTINUED | OUTPATIENT
Start: 2021-10-01 | End: 2021-10-15

## 2021-10-01 RX ORDER — ONDANSETRON 8 MG/1
4 TABLET, FILM COATED ORAL ONCE
Refills: 0 | Status: DISCONTINUED | OUTPATIENT
Start: 2021-10-01 | End: 2021-10-01

## 2021-10-01 NOTE — DISCHARGE NOTE NURSING/CASE MANAGEMENT/SOCIAL WORK - PATIENT PORTAL LINK FT
You can access the FollowMyHealth Patient Portal offered by [facility] by registering at the following website: [email or web address]. By joining [email or web address]’s FollowMyHealth portal, you will also be able to view your health information using other applications (apps) compatible with our system.

## 2021-10-01 NOTE — PRE-OP CHECKLIST - STERILIZATION AFFIRMATION
[address] [place] [address] PHYSICAL THERAPY AT [address] [place] [address], [address] [place] [address], [address], [address] [place] [address]  Phone: [phone]  Fax: [phone]  PROGRESS NOTE  Patient Name: Grupo Qureshi : 1943   Treatment/Medical Diagnosis: Right knee pain [M25.561]   Referral Source: Yfn Whaley DO     Date of Initial Visit: 3/17/17 Attended Visits: 19 Missed Visits: 0     SUMMARY OF TREATMENT  Physical therapy has consisted of therapeutic exercise and neuromuscular re-education for R knee ROM, LE strengthening, balance, and gait training, manual therapy including STM, DTM, joint mobs, PROM, pt education for activity modification, gait mechanics, and HEP. CURRENT STATUS  Pt has made excellent progress and reports 80% overall improvement. She is ambulating with SPC and her daughter reports she is ordering a quad cane as well. She is able to achieve 0 degrees of knee extension after stretching, but has difficulty maintaining that ROM. She is also lacking TKE when she ambulates but is able to correct with verbal cues. Leg length discrepancy is likely contributing to this, and pt and her daughter were advised to consider a custom heel lift. Pt would benefit from continued PT to further improve strength, ROM and educate in long term HEP. Previous Goals:  1. Pt will demonstrate R knee AROM 0-110  2. Pt will ambulate 500 feet independently using rollator walker for community ambulation  3. Pt will be able to negotiate 3 steps independently   4.  Pt will be able to transfer into and out of the car independently     Prior Level/Current Level:  1) Prior Level: -3 to 95 degrees   Current Level: 0-105 degrees after stretching; PROM flex: 110   Goal Met? progressing  2) Prior Level: n/a   Current Level: ambulating at least 500 feet with SPC   Goal Met? yes  3) Prior Level: n/a   Current Level: not assessed   Goal Met? ongoing  4) Prior Level: unable   Current Level: able to transfer into car with regular step height independently   Goal Met? yes    New Goals to be achieved in __1__  weeks:  1. Pt will demonstrate R knee AROM 0-110  2. Pt will be independent in long term HEP to continue strengthening  3. Pt will be able to negotiate 3 steps independently     RECOMMENDATIONS  Continue PT for remaining 3 visits next week then D/C to HEP. If you have any questions/comments please contact us directly at [phone]. Thank you for allowing us to assist in the care of your patient. Therapist Signature: Marcus Young, PT, DPT Date: 5/12/2017     Time: 12:18 PM   NOTE TO PHYSICIAN:  PLEASE COMPLETE THE ORDERS BELOW AND FAX TO   [facility] [place] [facility] at [place]: [phone]. If you are unable to process this request in 24 hours please contact our office: [phone].    ___ I have read the above report and request that my patient continue as recommended.   ___ I have read the above report and request that my patient continue therapy with the following changes/special instructions:_________________________________________________________   ___ I have read the above report and request that my patient be discharged from therapy.      Physician Signature:        Date:       Time: n/a

## 2021-12-09 NOTE — H&P PST ADULT - ALLERGY TYPES
[FreeTextEntry1] : 49 year old male found to have stable Mild Intermittent Asthma without complication, Elevated Hemoglobin A1c, Vitamin B12 Deficiency, Vitamin D Deficiency,with the current prescription regimen as recommended, diet and life style modifications, as counseled. Prior results reviewed, interpreted and discussed with the patient during today's examination, as appropriate. Follow up, treatment plan and tests, as ordered.\par  reactions to medicines

## 2022-02-14 ENCOUNTER — APPOINTMENT (OUTPATIENT)
Dept: OTOLARYNGOLOGY | Facility: CLINIC | Age: 37
End: 2022-02-14

## 2022-02-28 ENCOUNTER — APPOINTMENT (OUTPATIENT)
Dept: OTOLARYNGOLOGY | Facility: CLINIC | Age: 37
End: 2022-02-28
Payer: COMMERCIAL

## 2022-02-28 VITALS — WEIGHT: 123 LBS | HEIGHT: 61 IN | BODY MASS INDEX: 23.22 KG/M2

## 2022-02-28 DIAGNOSIS — J31.0 CHRONIC RHINITIS: ICD-10-CM

## 2022-02-28 DIAGNOSIS — Q90.9 DOWN SYNDROME, UNSPECIFIED: ICD-10-CM

## 2022-02-28 DIAGNOSIS — R09.81 NASAL CONGESTION: ICD-10-CM

## 2022-02-28 PROCEDURE — 99213 OFFICE O/P EST LOW 20 MIN: CPT

## 2022-02-28 RX ORDER — CETIRIZINE HYDROCHLORIDE 10 MG/1
10 TABLET, COATED ORAL
Qty: 30 | Refills: 1 | Status: ACTIVE | COMMUNITY
Start: 2022-02-28 | End: 1900-01-01

## 2022-02-28 RX ORDER — CLINDAMYCIN PHOSPHATE 10 MG/ML
1 LOTION TOPICAL
Refills: 0 | Status: ACTIVE | COMMUNITY

## 2022-02-28 RX ORDER — CALCIUM CARBONATE/VITAMIN D3 600 MG-10
TABLET ORAL
Refills: 0 | Status: ACTIVE | COMMUNITY

## 2022-02-28 NOTE — ASSESSMENT
[FreeTextEntry1] : LIMITTED EXAM\par DOWN SYNDROME\par RHINTIS\par FLONASE\par ZYRTEC\par F/U 6 MONTHS PRN

## 2022-02-28 NOTE — REASON FOR VISIT
[Subsequent Evaluation] : a subsequent evaluation for [Formal Caregiver] : formal caregiver [FreeTextEntry2] : sinus check up, history of MR and ear infections, ear perforation and ear tubes

## 2022-02-28 NOTE — HISTORY OF PRESENT ILLNESS
[de-identified] : 37 year old male presents for follow up for sinus check up, history of MR and ear infections, ear perforation and ear tubes. Caregiver states seasonal allergies are getting worse, daily anterior rhinorrhea, nasal congestion, occasionally has difficulty breathing. Caregiver denies witnessed otorrhea, fevers or recent ear infections. Currently using saline spray and Flonase daily

## 2022-02-28 NOTE — PHYSICAL EXAM
[FreeTextEntry1] : limitted exam/ non cooprative [Normal] : temporomandibular joint is normal [de-identified] : NO DISCHARGE [de-identified] : NASAL MUCOSAL CONGESTION [de-identified] : GINGIVITIS

## 2022-03-01 RX ORDER — AZELASTINE HYDROCHLORIDE 137 UG/1
0.1 SPRAY, METERED NASAL DAILY
Qty: 1 | Refills: 2 | Status: ACTIVE | COMMUNITY
Start: 2022-03-01 | End: 1900-01-01

## 2022-09-19 ENCOUNTER — APPOINTMENT (OUTPATIENT)
Dept: PEDIATRIC CARDIOLOGY | Facility: CLINIC | Age: 37
End: 2022-09-19

## 2022-10-17 ENCOUNTER — APPOINTMENT (OUTPATIENT)
Dept: PEDIATRIC CARDIOLOGY | Facility: CLINIC | Age: 37
End: 2022-10-17

## 2022-10-31 ENCOUNTER — APPOINTMENT (OUTPATIENT)
Dept: PEDIATRIC CARDIOLOGY | Facility: CLINIC | Age: 37
End: 2022-10-31

## 2022-10-31 ENCOUNTER — APPOINTMENT (OUTPATIENT)
Age: 37
End: 2022-10-31

## 2022-10-31 VITALS
HEART RATE: 76 BPM | WEIGHT: 126.32 LBS | DIASTOLIC BLOOD PRESSURE: 70 MMHG | BODY MASS INDEX: 23.87 KG/M2 | SYSTOLIC BLOOD PRESSURE: 114 MMHG

## 2022-10-31 PROCEDURE — 99213 OFFICE O/P EST LOW 20 MIN: CPT

## 2022-11-01 NOTE — DISCUSSION/SUMMARY
[FreeTextEntry1] : In summary, Satish is a 36 year old male with severe developmental delay related to Trisomy 21 and a history of repaired tetralogy of Fallot with resultant free pulmonary insufficiency and right ventricular dilation. His interim course is unchanged. We deferred an echocardiogram today as he is uncooperative and there is no change in his reported work of breathing. We did manage a very brief exam as described above..\par \par We continue to agree that pulmonary valve replacement would not result  with Satish experiencing a significant improvement in quality of life. As discussed previously, we think a more appropriate course would be medical management of symptoms related to ventricular dysfunction and/or arrhythmias. We have instructed his caretaker to notify us of any change in his daily breathing pattern or any syncopal event. SBE prophylaxis is reasonable given his delay and challenges with excellent dental hygiene. \par  I also think performing an MRI, given that no intervention would necessarily improve his QoL, is not indicated as it would necessitate him undergoing genral anesthesia.

## 2022-11-01 NOTE — PHYSICAL EXAM
[General Appearance - Alert] : alert [General Appearance - Well Nourished] : well nourished [Cooperative] : uncooperative [Down Syndrome] : Down Syndrome [] : no respiratory distress [Respiration, Rhythm And Depth] : normal respiratory rhythm and effort [Auscultation Breath Sounds / Voice Sounds] : breath sounds clear to auscultation bilaterally [No Cough] : no cough [Stridor] : no stridor was observed [Normal Chest Appearance] : the chest was normal in appearance [Chest Visual Inspection Thoracic Deformity] : no chest wall deformity [Chest Surgical / Traumatic Scar] : chest incision well healed [Sternotomy] : sternotomy [Unremarkable] : unremarkable [Heart Rate And Rhythm] : normal heart rate and rhythm [Apical Impulse] : quiet precordium with normal apical impulse [Normal S1] : normal  [S2 Wide Splitting] : had wide splitting [III] : a grade 3/6   [LMSB] : LMSB  [Ejection] : ejection [High] : high pitched [Harsh] : harsh [Early] : early [No Diastolic Murmur] : no diastolic murmur was heard [Bowel Sounds] : normal bowel sounds [Abdomen Soft] : soft [Nondistended] : nondistended [Abdomen Tenderness] : non-tender [Nail Clubbing] : no clubbing  or cyanosis of the fingers

## 2022-11-01 NOTE — REVIEW OF SYSTEMS
[Nl] : Cardiovascular [Tachypnea] : not tachypneic [Wheezing] : no wheezing [Cough] : no cough [Shortness Of Breath] : not expressed as feeling short of breath [Vomiting] : no vomiting [Diarrhea] : no diarrhea [Abdominal Pain] : no abdominal pain [Decrease In Appetite] : appetite not decreased [Fainting (Syncope)] : no fainting [Seizure] : no seizures [Limping] : no limping [Joint Pains] : no arthralgias [Joint Swelling] : no joint swelling [Rash] : no rash [Wound problems] : no wound problems [Swollen Glands] : no lymphadenopathy [Easy Bruising] : no tendency for easy bruising [Easy Bleeding] : no ~M tendency for easy bleeding [Nosebleeds] : no epistaxis [Sleep Disturbances] : ~T no sleep disturbances [Hyperactive] : hyperactive behavior [Failure To Thrive] : no failure to thrive [Short Stature] : short stature was not noted

## 2022-11-01 NOTE — CONSULT LETTER
[Today's Date] : [unfilled] [Dear  ___:] : Dear Dr. [unfilled]: [Consult - Single Provider] : Thank you very much for allowing me to participate in the care of this patient. If you have any questions, please do not hesitate to contact me. [Sincerely,] : Sincerely, [FreeTextEntry4] : Dr BARBARA Yañez [FreeTextEntry5] : [address]  [FreeTextEntry6] : [place] [address]

## 2022-11-01 NOTE — HISTORY OF PRESENT ILLNESS
[FreeTextEntry1] : History of Present Illness\par We had the pleasure of seeing Satish Sanders in the [facility] of [facility] on October 30, 2022 for follow up. \par \par As you know, he is a 37 year old male with severe developmental delay due to Trisomy 21 and tetralogy of Fallot status post transannular patch repair in infancy. 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 His last cardiac MRI in 2013 revealed an RVEF of 46%, RVEDV of 318 ml, and RVEDVi of 191 ml/m2. Prior discussions with Satish's family have been had that the benefits of a pulmonary valve replacement would not really translate to an improvement in quality of life given his profound delay. Medical management of chronic sequelae has been the goal of treatment.\par \par Satish comes today with an aide from his residential facility. Satish is again agitated and uncooperative today though he did seem to recognize me and actually allowed me to listen to his heart though he rebuffed any efforts to do an ECG.  According to the aide who accompanied him today,  there has been no change in Satish's work of breathing or energy level since I saw him last September.\par \par He has not had fast or heavy breathing. He has not had syncope. He is nonverbal and requires full assistance with ADLs. \par

## 2022-11-11 NOTE — PRE-OP CHECKLIST - IDENTIFICATION BAND VERIFIED
Impression: Keratoconjunctivitis sicca, bilateral: F56.683. Plan: Patient to use artificial tears as needed. done

## 2023-01-24 ENCOUNTER — APPOINTMENT (OUTPATIENT)
Dept: GASTROENTEROLOGY | Facility: CLINIC | Age: 38
End: 2023-01-24

## 2023-04-19 ENCOUNTER — APPOINTMENT (OUTPATIENT)
Dept: CT IMAGING | Facility: CLINIC | Age: 38
End: 2023-04-19
Payer: COMMERCIAL

## 2023-04-19 ENCOUNTER — OUTPATIENT (OUTPATIENT)
Dept: OUTPATIENT SERVICES | Facility: HOSPITAL | Age: 38
LOS: 1 days | End: 2023-04-19
Payer: COMMERCIAL

## 2023-04-19 DIAGNOSIS — Z00.8 ENCOUNTER FOR OTHER GENERAL EXAMINATION: ICD-10-CM

## 2023-04-19 PROCEDURE — 74177 CT ABD & PELVIS W/CONTRAST: CPT | Mod: 26

## 2023-04-19 PROCEDURE — 74177 CT ABD & PELVIS W/CONTRAST: CPT

## 2023-04-26 ENCOUNTER — TRANSCRIPTION ENCOUNTER (OUTPATIENT)
Age: 38
End: 2023-04-26

## 2023-08-18 ENCOUNTER — OUTPATIENT (OUTPATIENT)
Dept: OUTPATIENT SERVICES | Facility: HOSPITAL | Age: 38
LOS: 1 days | End: 2023-08-18
Payer: MEDICARE

## 2023-08-18 VITALS
WEIGHT: 128.09 LBS | TEMPERATURE: 98 F | HEIGHT: 61 IN | OXYGEN SATURATION: 97 % | DIASTOLIC BLOOD PRESSURE: 70 MMHG | HEART RATE: 61 BPM | RESPIRATION RATE: 16 BRPM | SYSTOLIC BLOOD PRESSURE: 97 MMHG

## 2023-08-18 DIAGNOSIS — K02.9 DENTAL CARIES, UNSPECIFIED: ICD-10-CM

## 2023-08-18 DIAGNOSIS — K02.62 DENTAL CARIES ON SMOOTH SURFACE PENETRATING INTO DENTIN: ICD-10-CM

## 2023-08-18 DIAGNOSIS — E03.9 HYPOTHYROIDISM, UNSPECIFIED: ICD-10-CM

## 2023-08-18 DIAGNOSIS — Q21.3 TETRALOGY OF FALLOT: ICD-10-CM

## 2023-08-18 DIAGNOSIS — Q90.9 DOWN SYNDROME, UNSPECIFIED: ICD-10-CM

## 2023-08-18 DIAGNOSIS — Z98.818 OTHER DENTAL PROCEDURE STATUS: Chronic | ICD-10-CM

## 2023-08-18 DIAGNOSIS — Z87.74 PERSONAL HISTORY OF (CORRECTED) CONGENITAL MALFORMATIONS OF HEART AND CIRCULATORY SYSTEM: ICD-10-CM

## 2023-08-18 LAB
ANION GAP SERPL CALC-SCNC: 12 MMOL/L — SIGNIFICANT CHANGE UP (ref 7–14)
BUN SERPL-MCNC: 14 MG/DL — SIGNIFICANT CHANGE UP (ref 7–23)
CALCIUM SERPL-MCNC: 9.4 MG/DL — SIGNIFICANT CHANGE UP (ref 8.4–10.5)
CHLORIDE SERPL-SCNC: 102 MMOL/L — SIGNIFICANT CHANGE UP (ref 98–107)
CO2 SERPL-SCNC: 27 MMOL/L — SIGNIFICANT CHANGE UP (ref 22–31)
CREAT SERPL-MCNC: 0.74 MG/DL — SIGNIFICANT CHANGE UP (ref 0.5–1.3)
EGFR: 120 ML/MIN/1.73M2 — SIGNIFICANT CHANGE UP
GLUCOSE SERPL-MCNC: 86 MG/DL — SIGNIFICANT CHANGE UP (ref 70–99)
HCT VFR BLD CALC: 44.2 % — SIGNIFICANT CHANGE UP (ref 39–50)
HGB BLD-MCNC: 14.9 G/DL — SIGNIFICANT CHANGE UP (ref 13–17)
MCHC RBC-ENTMCNC: 31.6 PG — SIGNIFICANT CHANGE UP (ref 27–34)
MCHC RBC-ENTMCNC: 33.7 GM/DL — SIGNIFICANT CHANGE UP (ref 32–36)
MCV RBC AUTO: 93.8 FL — SIGNIFICANT CHANGE UP (ref 80–100)
NRBC # BLD: 0 /100 WBCS — SIGNIFICANT CHANGE UP (ref 0–0)
NRBC # FLD: 0 K/UL — SIGNIFICANT CHANGE UP (ref 0–0)
PLATELET # BLD AUTO: 203 K/UL — SIGNIFICANT CHANGE UP (ref 150–400)
POTASSIUM SERPL-MCNC: 4.3 MMOL/L — SIGNIFICANT CHANGE UP (ref 3.5–5.3)
POTASSIUM SERPL-SCNC: 4.3 MMOL/L — SIGNIFICANT CHANGE UP (ref 3.5–5.3)
RBC # BLD: 4.71 M/UL — SIGNIFICANT CHANGE UP (ref 4.2–5.8)
RBC # FLD: 13.8 % — SIGNIFICANT CHANGE UP (ref 10.3–14.5)
SODIUM SERPL-SCNC: 141 MMOL/L — SIGNIFICANT CHANGE UP (ref 135–145)
WBC # BLD: 4.58 K/UL — SIGNIFICANT CHANGE UP (ref 3.8–10.5)
WBC # FLD AUTO: 4.58 K/UL — SIGNIFICANT CHANGE UP (ref 3.8–10.5)

## 2023-08-18 PROCEDURE — 93010 ELECTROCARDIOGRAM REPORT: CPT

## 2023-08-18 RX ORDER — LEVOTHYROXINE SODIUM 125 MCG
1 TABLET ORAL
Refills: 0 | DISCHARGE

## 2023-08-18 RX ORDER — CHOLECALCIFEROL (VITAMIN D3) 125 MCG
0 CAPSULE ORAL
Refills: 0 | DISCHARGE

## 2023-08-18 RX ORDER — BENZOYL PEROXIDE 50 MG/ML
1 GEL TOPICAL
Refills: 0 | DISCHARGE

## 2023-08-18 RX ORDER — LORATADINE 10 MG/1
1 TABLET ORAL
Refills: 0 | DISCHARGE

## 2023-08-18 RX ORDER — QUETIAPINE FUMARATE 200 MG/1
1 TABLET, FILM COATED ORAL
Refills: 0 | DISCHARGE

## 2023-08-18 RX ORDER — LANOLIN/MINERAL OIL
1 LOTION (ML) TOPICAL
Refills: 0 | DISCHARGE

## 2023-08-18 RX ORDER — LACTOBACILLUS ACIDOPHILUS 100MM CELL
1 CAPSULE ORAL
Refills: 0 | DISCHARGE

## 2023-08-18 RX ORDER — LEVOTHYROXINE SODIUM 125 MCG
1 TABLET ORAL
Qty: 0 | Refills: 0 | DISCHARGE

## 2023-08-18 RX ORDER — TRAZODONE HCL 50 MG
1 TABLET ORAL
Refills: 0 | DISCHARGE

## 2023-08-18 RX ORDER — ERYTHROMYCIN BASE IN ETHANOL 2 %
1 SWAB, MEDICATED TOPICAL
Refills: 0 | DISCHARGE

## 2023-08-18 RX ORDER — FLUTICASONE PROPIONATE 50 MCG
1 SPRAY, SUSPENSION NASAL
Refills: 0 | DISCHARGE

## 2023-08-18 RX ORDER — QUETIAPINE FUMARATE 200 MG/1
1 TABLET, FILM COATED ORAL
Qty: 0 | Refills: 0 | DISCHARGE

## 2023-08-18 RX ORDER — IPRATROPIUM BROMIDE 21 MCG
2 AEROSOL, SPRAY (ML) NASAL
Refills: 0 | DISCHARGE

## 2023-08-18 RX ORDER — SODIUM CHLORIDE 9 MG/ML
1000 INJECTION, SOLUTION INTRAVENOUS
Refills: 0 | Status: DISCONTINUED | OUTPATIENT
Start: 2023-08-30 | End: 2023-09-13

## 2023-08-18 RX ORDER — ACETAMINOPHEN 500 MG
10 TABLET ORAL
Refills: 0 | DISCHARGE

## 2023-08-18 RX ORDER — LORATADINE 10 MG/1
1 TABLET ORAL
Qty: 0 | Refills: 0 | DISCHARGE

## 2023-08-18 RX ORDER — LACTOBACILLUS ACIDOPHILUS 100MM CELL
2 CAPSULE ORAL
Qty: 0 | Refills: 0 | DISCHARGE

## 2023-08-18 RX ORDER — TRAZODONE HCL 50 MG
0.5 TABLET ORAL
Qty: 0 | Refills: 0 | DISCHARGE

## 2023-08-18 NOTE — H&P PST ADULT - NSICDXPASTMEDICALHX_GEN_ALL_CORE_FT
PAST MEDICAL HISTORY:  Affective Bipolar Disorder     Dental caries     Down Syndrome     GERD (gastroesophageal reflux disease)     High myopia     History of Hypothyroidism     HLD (hyperlipidemia)     Hypothyroidism     Intellectual disability     Periodontal disease     Pulmonary valve insufficiency

## 2023-08-18 NOTE — H&P PST ADULT - NSICDXPASTSURGICALHX_GEN_ALL_CORE_FT
PAST SURGICAL HISTORY:  Other dental procedure status     Tetralogy of Fallot s/p repair long time ago

## 2023-08-18 NOTE — H&P PST ADULT - PROBLEM SELECTOR PLAN 3
Mother who is the guardian will provide consent via phone on DOS.    Miriam Marilyn   [phone]

## 2023-08-18 NOTE — H&P PST ADULT - PROBLEM SELECTOR PLAN 1
Patient tentatively scheduled for comprehensive dental treatment under general anesthesia for 8/30/23. Pre-op instructions provided to group home staff. Given verbal and written instructions with teach back pepcid. Group home staff verbalized understanding with return demonstration.     CBC BMP EKG done.    Pending medical evaluation.

## 2023-08-18 NOTE — H&P PST ADULT - HISTORY OF PRESENT ILLNESS
37 y/o male PMH Tetralogy of fallot repair as an infant, pulmonary valve insufficiency, Complete Trisomy 21, autism, ADHD, non-verbal, intellectual disability, bipolar, hypothyroidism presents to presurgical testing with diagnosis of dental caries and periodontal disease. Pt is scheduled for comprehensive dental treatment under general anesthesia

## 2023-08-29 ENCOUNTER — TRANSCRIPTION ENCOUNTER (OUTPATIENT)
Age: 38
End: 2023-08-29

## 2023-08-29 NOTE — ASU PATIENT PROFILE, ADULT - FALL HARM RISK - HARM RISK INTERVENTIONS

## 2023-08-30 ENCOUNTER — TRANSCRIPTION ENCOUNTER (OUTPATIENT)
Age: 38
End: 2023-08-30

## 2023-08-30 ENCOUNTER — OUTPATIENT (OUTPATIENT)
Dept: OUTPATIENT SERVICES | Facility: HOSPITAL | Age: 38
LOS: 1 days | Discharge: HOME | End: 2023-08-30

## 2023-08-30 VITALS
SYSTOLIC BLOOD PRESSURE: 111 MMHG | WEIGHT: 128.09 LBS | HEIGHT: 61 IN | RESPIRATION RATE: 16 BRPM | TEMPERATURE: 98 F | OXYGEN SATURATION: 96 % | HEART RATE: 68 BPM | DIASTOLIC BLOOD PRESSURE: 92 MMHG

## 2023-08-30 VITALS
OXYGEN SATURATION: 100 % | TEMPERATURE: 97 F | RESPIRATION RATE: 16 BRPM | SYSTOLIC BLOOD PRESSURE: 114 MMHG | HEART RATE: 70 BPM | DIASTOLIC BLOOD PRESSURE: 89 MMHG

## 2023-08-30 DIAGNOSIS — Z98.818 OTHER DENTAL PROCEDURE STATUS: Chronic | ICD-10-CM

## 2023-08-30 DIAGNOSIS — K02.62 DENTAL CARIES ON SMOOTH SURFACE PENETRATING INTO DENTIN: ICD-10-CM

## 2023-08-30 RX ORDER — LACTOBACILLUS ACIDOPHILUS 100MM CELL
1 CAPSULE ORAL
Refills: 0 | DISCHARGE

## 2023-08-30 RX ORDER — IPRATROPIUM BROMIDE 21 MCG
2 AEROSOL, SPRAY (ML) NASAL
Refills: 0 | DISCHARGE

## 2023-08-30 RX ORDER — FLUTICASONE PROPIONATE 50 MCG
1 SPRAY, SUSPENSION NASAL
Refills: 0 | DISCHARGE

## 2023-08-30 RX ORDER — FENTANYL CITRATE 50 UG/ML
25 INJECTION INTRAVENOUS
Refills: 0 | Status: DISCONTINUED | OUTPATIENT
Start: 2023-08-30 | End: 2023-08-30

## 2023-08-30 RX ORDER — ATORVASTATIN CALCIUM 80 MG/1
1 TABLET, FILM COATED ORAL
Refills: 0 | DISCHARGE

## 2023-08-30 RX ORDER — QUETIAPINE FUMARATE 200 MG/1
1 TABLET, FILM COATED ORAL
Refills: 0 | DISCHARGE

## 2023-08-30 RX ORDER — TRAZODONE HCL 50 MG
1 TABLET ORAL
Refills: 0 | DISCHARGE

## 2023-08-30 RX ORDER — LORATADINE 10 MG/1
1 TABLET ORAL
Refills: 0 | DISCHARGE

## 2023-08-30 RX ORDER — ACETAMINOPHEN 500 MG
10 TABLET ORAL
Refills: 0 | DISCHARGE

## 2023-08-30 RX ORDER — LEVOTHYROXINE SODIUM 125 MCG
1 TABLET ORAL
Refills: 0 | DISCHARGE

## 2023-08-30 RX ORDER — CHOLECALCIFEROL (VITAMIN D3) 125 MCG
0 CAPSULE ORAL
Refills: 0 | DISCHARGE

## 2023-08-30 RX ORDER — BENZOYL PEROXIDE 50 MG/ML
1 GEL TOPICAL
Refills: 0 | DISCHARGE

## 2023-08-30 RX ORDER — LANOLIN/MINERAL OIL
1 LOTION (ML) TOPICAL
Refills: 0 | DISCHARGE

## 2023-08-30 RX ORDER — ERYTHROMYCIN BASE IN ETHANOL 2 %
1 SWAB, MEDICATED TOPICAL
Refills: 0 | DISCHARGE

## 2023-08-30 NOTE — ASU DISCHARGE PLAN (ADULT/PEDIATRIC) - NURSING INSTRUCTIONS
DO NOT take any Tylenol (Acetaminophen) or narcotics containing Tylenol until after  4:00PM . You received Tylenol during your operation and it can cause damage to your liver if too much is taken within a 24 hour time period

## 2023-08-30 NOTE — ASU DISCHARGE PLAN (ADULT/PEDIATRIC) - ASU DC SPECIAL INSTRUCTIONSFT
comprehensive dental treatment under general anesthesia    tylenol prn pain    patient can return  to routine activities/school tomorrow     exam, xrays, cleaning, periodontal treatment and [place]ide    see DR Aguiar at [facility] in [place] 9/12 at 2 pm, appointment is set and if it needs to be changed call [phone]

## 2023-10-05 PROBLEM — E03.9 HYPOTHYROIDISM, UNSPECIFIED: Chronic | Status: ACTIVE | Noted: 2023-08-18

## 2023-10-05 PROBLEM — K05.6 PERIODONTAL DISEASE, UNSPECIFIED: Chronic | Status: ACTIVE | Noted: 2023-08-18

## 2023-10-05 PROBLEM — I37.1 NONRHEUMATIC PULMONARY VALVE INSUFFICIENCY: Chronic | Status: ACTIVE | Noted: 2023-08-18

## 2023-10-05 PROBLEM — K02.9 DENTAL CARIES, UNSPECIFIED: Chronic | Status: ACTIVE | Noted: 2023-08-18

## 2023-10-05 PROBLEM — F79 UNSPECIFIED INTELLECTUAL DISABILITIES: Chronic | Status: ACTIVE | Noted: 2023-08-18

## 2023-10-05 PROBLEM — H52.10 MYOPIA, UNSPECIFIED EYE: Chronic | Status: ACTIVE | Noted: 2023-08-18

## 2023-11-29 ENCOUNTER — APPOINTMENT (OUTPATIENT)
Dept: PEDIATRIC CARDIOLOGY | Facility: CLINIC | Age: 38
End: 2023-11-29
Payer: COMMERCIAL

## 2023-11-29 VITALS
SYSTOLIC BLOOD PRESSURE: 102 MMHG | RESPIRATION RATE: 18 BRPM | WEIGHT: 132.06 LBS | DIASTOLIC BLOOD PRESSURE: 64 MMHG | HEART RATE: 79 BPM | OXYGEN SATURATION: 97 % | HEIGHT: 61.02 IN | BODY MASS INDEX: 24.93 KG/M2

## 2023-11-29 PROCEDURE — 99204 OFFICE O/P NEW MOD 45 MIN: CPT

## 2023-11-29 RX ORDER — GUAIFENESIN 1200 MG/1
TABLET, EXTENDED RELEASE ORAL
Refills: 0 | Status: DISCONTINUED | COMMUNITY
End: 2023-11-29

## 2023-11-29 RX ORDER — LORAZEPAM 2 MG/1
TABLET ORAL
Refills: 0 | Status: DISCONTINUED | COMMUNITY
End: 2023-11-29

## 2023-11-29 RX ORDER — ATORVASTATIN CALCIUM 20 MG/1
20 TABLET, FILM COATED ORAL
Refills: 0 | Status: ACTIVE | COMMUNITY

## 2023-11-29 RX ORDER — LEVOTHYROXINE SODIUM 0.09 MG/1
88 TABLET ORAL
Refills: 0 | Status: ACTIVE | COMMUNITY

## 2023-12-27 NOTE — H&P PST ADULT - PROBLEM SELECTOR PLAN 1
Patient called needed a refill, but wanted to let you know she is moving to [place] moving in with her daughter.      She said thank you for everything.     Asked please do not remove dr. Espinoza from [place] she has to find a provider in [place].    Not going down till later in january   Comprehensive dental treatment on 10/1/21  -pre- op instructions discussed with group home staff   no meds the DOS due to - pt takes meds with applesauce   -covid test 9/28/21  -labs sent   -medical eval   -last cardiac visit note

## 2024-01-01 NOTE — DISCHARGE NOTE NURSING/CASE MANAGEMENT/SOCIAL WORK - NURSING SECTION COMPLETE
Birth Weight: 3730 g=  8 lbs  4 oz    Percentage of weight loss: -6.4%     Current Weight: 3490 g= 7 lbs 11 oz    Latest TCB result: Result/Transcutaneous Bilirubin: 5     Latest Serum Bilirubin: No results found for: \"BILIRUBIN\"     [place] Screen: Sent/pending    Hepatitis B Vaccine:  Given 24    Critical Congenital Heart Disease (CCHD): Screening Completed: Done-Passed    [facility] Hearing Screen                 Date of Screenin2024                  Results: [place] Hearing Test Results: Pass R, Pass L, Final result     Follow up date for referral: N/A    Other:    [facility] Hours:  Monday thru Friday - 7am to 630pm*  Saturday - 7am to 3pm*   &  - CLOSED  *Patients must arrive 30 minutes prior to closing to allow for registration process.  Parking Garage located across from [facility] on [address] [facility] Services: Monday - Friday 8:00am - 3:30pm      Call your baby’s doctor if your baby has any of these signs or symptoms:  Fever/Sickness  Temperature of 100.4F (38C) or higher   Vomiting that is green, bloody, or projectile   New or unusual rash   Dehydration:   Dry or cracked lips, tongue, or skin   Increased sleepiness or irritability  Feeding  Unable to arouse for feedings   Eating poorly or refusing to eat   Repeated vomiting/choking during feedings   Is not eating at least 8 times in 24 hours  Behavior  Listlessness (doesn’t have much energy)   Unusually sleepy or hard to wake up   Inconsolable, very fussy   Sharp, high-pitched cry or crying excessively with no known cause  Falls from any height or hits their head  Skin Color  Jaundice (yellow skin or eyes)   Pale or blue colored skin   White patches found in ’s mouth  Umbilical Cord   Skin around the base of the cord is red   Foul odor or drainage from the cord  Bowel & Bladder  Constipation (hard, pebble-like stool) or no stools for 48 hours   Diarrhea (loose, watery stools),  especially if there is mucus, blood, or foul smell   Has fewer than 6 wet diapers a day  Car Seat Safety  [place] law requires children under age 2 to be properly secured in a rear-facing child restraint system until age 2, unless they weigh more than 40 pounds or are more than 40 inches tall  Never install a rear-facing car seat in front of an active airbag  Do not use a safety seat that is more than 6 years old or past the expiration date stamped on the seat  [place] your safety seat with the  so you will be notified of any recalls  Do not attach anything to the car safety seat unless the  allows its use. This includes toys, mirrors, window shades and belt tightening tools  [place] law requires all children under age 8 to be secured in an appropriate child safety seat  Keep children in the back seat until they reach age 13  For more information visit www.[place].gov    FEVER  - Fever in the first 3 months of life is an emergency.  - The first sign that baby may be ill is if they are not interested in eating for a prolonged period of time.  Other signs include weakness, pale skin, and other abnormal behavior.  These signs may develop before fever develops.  If you see these signs, you should check the baby's temperature.  - The most accurate way to check a baby's temperature is in the rectum.  You will first need a baby thermometer, which you can buy at any pharmacy.  To check the rectal temperature, insert only the metal tip of the thermometer into the baby's anus and hold it still until the reading appears.  - A fever is any rectal temperature more than 100.4 Fahrenheit or 38 Celsius.  In the first 3 months of life, a fever is an emergency.  For this reason, you should call your pediatrician immediately.  If you cannot reach the pediatrician, you should take your baby to the emergency room.  - This advice is only relevant if you use a baby thermometer.  Do not, for any reason, insert an  adult thermometer into a baby's rectum.  Do not, for any reason, insert the baby thermometer into the rectum beyond the metal tip.  If any bleeding occurs after you do this, you should call the pediatrician.    VAGINAL DISCHARGE  - Vaginal discharge is expected in a female .  This may last up to 2 weeks as a mother's hormones slowly leave the baby's body.  - There may be specks of blood in the vaginal area or in baby's diaper, and this is also normal.  However, seeing a large amount of visible blood is abnormal.  If you see large amounts of visible blood, call the baby's pediatrician.  - Poop can become lodged in the vaginal area on female babies.  This can cause infection, so it should be cleaned away gently with a baby wipe.  Be sure to wipe the baby from front to back whether you are bathing the baby or changing a diaper.  - Do not scrub at a baby's vagina to clean discharge as this may cause an injury.  Gently clean the area during baths and diaper changes.  Slowly, the discharge should be cleaned away.    If you are feeling as if you cannot deal with your baby's crying and you have met the baby's basic needs (clean diaper, fed, appropriate clothes, gently rocked, held, etc.), then stop, think and reach out for help if you need it. There may be times when nothing you do will stop the crying. . . this normal.   DO NOT SHAKE YOUR BABY.   If you think your baby has been shaken, go to the emergency room        Our hospital and medical team have enjoyed caring for you and your baby during this special time. If you have additional questions about these discharge instructions, please call [phone].   For breastfeeding support or questions, please contact our lactation consultants at [phone].        Want to Say “Thank You” to a Nurse?  The MATY Award® was created in memory of KATE East by his family to say thank you to bedside nurses who provide an outstanding level of care.  Submit a nomination  using any method below.     OR    [email or web address]           Patient/Caregiver provided printed discharge information.

## 2024-05-15 ENCOUNTER — NON-APPOINTMENT (OUTPATIENT)
Age: 39
End: 2024-05-15

## 2024-11-25 ENCOUNTER — APPOINTMENT (OUTPATIENT)
Dept: PEDIATRIC CARDIOLOGY | Facility: CLINIC | Age: 39
End: 2024-11-25
Payer: MEDICARE

## 2024-11-25 VITALS
WEIGHT: 125.66 LBS | HEART RATE: 73 BPM | BODY MASS INDEX: 24.03 KG/M2 | SYSTOLIC BLOOD PRESSURE: 98 MMHG | HEIGHT: 60.63 IN | DIASTOLIC BLOOD PRESSURE: 59 MMHG | OXYGEN SATURATION: 96 %

## 2024-11-25 PROCEDURE — 99214 OFFICE O/P EST MOD 30 MIN: CPT

## 2024-11-25 PROCEDURE — 93303 ECHO TRANSTHORACIC: CPT

## 2024-11-25 PROCEDURE — G2211 COMPLEX E/M VISIT ADD ON: CPT

## 2024-12-17 DIAGNOSIS — I37.1 NONRHEUMATIC PULMONARY VALVE INSUFFICIENCY: ICD-10-CM

## 2024-12-17 DIAGNOSIS — Q21.3 TETRALOGY OF FALLOT: ICD-10-CM

## 2025-01-31 DIAGNOSIS — Q21.3 TETRALOGY OF FALLOT: ICD-10-CM

## 2025-01-31 DIAGNOSIS — I37.1 NONRHEUMATIC PULMONARY VALVE INSUFFICIENCY: ICD-10-CM

## 2025-04-05 NOTE — HISTORY OF PRESENT ILLNESS
The patient is a 19y Female complaining of abdominal pain. [de-identified] : 34 year old male follow up for sinus check up, history of MR and ear infections, ear perforation and ear tubes, patient from group home.

## 2025-04-09 ENCOUNTER — OUTPATIENT (OUTPATIENT)
Dept: OUTPATIENT SERVICES | Facility: HOSPITAL | Age: 40
LOS: 1 days | End: 2025-04-09

## 2025-04-09 VITALS
DIASTOLIC BLOOD PRESSURE: 70 MMHG | OXYGEN SATURATION: 97 % | SYSTOLIC BLOOD PRESSURE: 107 MMHG | HEIGHT: 62 IN | HEART RATE: 69 BPM | WEIGHT: 123.02 LBS | RESPIRATION RATE: 18 BRPM | TEMPERATURE: 98 F

## 2025-04-09 DIAGNOSIS — Z98.818 OTHER DENTAL PROCEDURE STATUS: Chronic | ICD-10-CM

## 2025-04-09 DIAGNOSIS — I37.1 NONRHEUMATIC PULMONARY VALVE INSUFFICIENCY: ICD-10-CM

## 2025-04-09 RX ORDER — EMOLLIENT COMBINATION NO.35
1 CREAM (GRAM) TOPICAL
Refills: 0 | DISCHARGE

## 2025-04-09 RX ORDER — QUETIAPINE FUMARATE 25 MG/1
1 TABLET ORAL
Refills: 0 | DISCHARGE

## 2025-04-09 RX ORDER — CLINDAMYCIN PHOSPHATE 150 MG/ML
2 VIAL (ML) INJECTION
Refills: 0 | DISCHARGE

## 2025-04-09 RX ORDER — LACTOBACILLUS ACIDOPHILUS/PECT 75 MM-100
1 CAPSULE ORAL
Refills: 0 | DISCHARGE

## 2025-04-09 RX ORDER — ACETAMINOPHEN 500 MG/5ML
10 LIQUID (ML) ORAL
Refills: 0 | DISCHARGE

## 2025-04-09 RX ORDER — ERYTHROMYCIN BASE IN ETHANOL 2 %
1 SOLUTION, NON-ORAL TOPICAL
Refills: 0 | DISCHARGE

## 2025-04-09 RX ORDER — LEVOTHYROXINE SODIUM 300 MCG
1 TABLET ORAL
Refills: 0 | DISCHARGE

## 2025-04-09 RX ORDER — FLUTICASONE PROPIONATE 50 UG/1
2 SPRAY, METERED NASAL
Refills: 0 | DISCHARGE

## 2025-04-09 RX ORDER — IPRATROPIUM BROMIDE 42 UG/1
2 SPRAY NASAL
Refills: 0 | DISCHARGE

## 2025-04-09 RX ORDER — CALCIUM CARBONATE/VITAMIN D3 500MG-5MCG
2 TABLET ORAL
Refills: 0 | DISCHARGE

## 2025-04-09 RX ORDER — B1/B2/B3/B5/B6/B12/VIT C/FOLIC 500-0.5 MG
1 TABLET ORAL
Refills: 0 | DISCHARGE

## 2025-04-09 RX ORDER — OMEGA-3-ACID ETHYL ESTERS CAPSULES 1 G/1
1 CAPSULE, LIQUID FILLED ORAL
Refills: 0 | DISCHARGE

## 2025-04-09 RX ORDER — LORATADINE 5 MG/5ML
1 SOLUTION ORAL
Refills: 0 | DISCHARGE

## 2025-04-09 RX ORDER — ATORVASTATIN CALCIUM 80 MG/1
1 TABLET, FILM COATED ORAL
Refills: 0 | DISCHARGE

## 2025-04-09 NOTE — H&P PST ADULT - HISTORY OF PRESENT ILLNESS
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  His last cardiac MRI in 2013 revealed an RVEF of 46%, RVEDV of 318 ml, and RVEDVi of 191 ml/m2.  TTE 11/2024 shows severe TR and RV hypertrophy, likely slightly worse from what was seen in 2020.    Echo: 11/27/2024. Summary:  1. Study limited by patient agitation and poor echo windows.  2. Tetralogy of Fallot, status post closure of anterior malalignment ventricular septal defect and right ventricular outflow tract transannular patch.  3. Patent foramen ovale with left to right shunt, normal variant.  4. S/p transannularr patch without significant residual right ventricular outflow tract obstruction or residual pulmonary valve stenosis. Wide jet of free and severe pulmonary insufficiency noted.  5. Mildly dilated appearing right atrium.  6. Mild tricuspid valve regurgitation, peak systolic instantaneous gradient 37.7 mmHg.  7. Normal left ventricular chamber dimensions and systolic function.  8. Severely dilated and mild to moderately hypertrophied appearing right ventricle with qualitatively normal systolic function. The right ventricular dilation appears to tip and foreshorten the left ventricular apex.  9. Branch pulmonary arteries could not be evaluated in this study due to patient agitation. On prior study from 2020, the LPA was reported as smaller with acceleration of flow, peak 32mmHg.  10. No pericardial effusion.             Pt is scheduled for sedated MRI/MRA.   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  His last cardiac MRI in 2013 revealed an RVEF of 46%, RVEDV of 318 ml, and RVEDVi of 191 ml/m2.  TTE 11/2024 shows severe TR and RV hypertrophy, likely slightly worse from what was seen in 2020.    Echo: 11/27/2024. Summary:  1. Study limited by patient agitation and poor echo windows.  2. Tetralogy of Fallot, status post closure of anterior malalignment ventricular septal defect and right ventricular outflow tract transannular patch.  3. Patent foramen ovale with left to right shunt, normal variant.  4. S/p transannularr patch without significant residual right ventricular outflow tract obstruction or residual pulmonary valve stenosis. Wide jet of free and severe pulmonary insufficiency noted.  5. Mildly dilated appearing right atrium.  6. Mild tricuspid valve regurgitation, peak systolic instantaneous gradient 37.7 mmHg.  7. Normal left ventricular chamber dimensions and systolic function.  8. Severely dilated and mild to moderately hypertrophied appearing right ventricle with qualitatively normal systolic function. The right ventricular dilation appears to tip and foreshorten the left ventricular apex.  9. Branch pulmonary arteries could not be evaluated in this study due to patient agitation. On prior study from 2020, the LPA was reported as smaller with acceleration of flow, peak 32mmHg.  10. No pericardial effusion.             Pt is scheduled for sedated MRI/MRA.

## 2025-04-09 NOTE — H&P PST ADULT - EKG AND INTERPRETATION
laying with toy - constant movement of b/l arms - cardiology unable to perform EKG 11/2024, last EKG in [place] from 2023 Pt playing with toy - constant movement of b/l arms - cardiology unable to perform EKG 11/2024, last EKG in [place] from 2023

## 2025-04-09 NOTE — H&P PST ADULT - NSICDXPASTMEDICALHX_GEN_ALL_CORE_FT
PAST MEDICAL HISTORY:  Affective Bipolar Disorder     Dental caries     Down Syndrome     GERD (gastroesophageal reflux disease)     High myopia     History of Hypothyroidism     HLD (hyperlipidemia)     Hypothyroidism     Intellectual disability     Periodontal disease     Pulmonary valve insufficiency     Trisomy 21      PAST MEDICAL HISTORY:  Affective Bipolar Disorder     Dental caries     Down Syndrome     GERD (gastroesophageal reflux disease)     High myopia     History of Hypothyroidism     History of tetralogy of Fallot     HLD (hyperlipidemia)     Hypothyroidism     Intellectual disability     Periodontal disease     Pulmonary valve insufficiency     Trisomy 21

## 2025-04-09 NOTE — H&P PST ADULT - FUNCTIONAL STATUS
DASI Score 4.95- able to run a short distance, daily physical activity with program in [facility]

## 2025-04-09 NOTE — H&P PST ADULT - ENMT COMMENTS
Denies loose teeth or dentures.  Mallampati takes pills with apple sauce Denies loose teeth or dentures.  Unable to assess Mallampati - not following commands

## 2025-04-09 NOTE — H&P PST ADULT - PROBLEM SELECTOR PLAN 1
Pt is scheduled for sedated MRI/MRA on 4/24/25.  Verbal and written pre op instructions reviewed with patient and pt able to verbalize understanding.     Pt normally takes levothyroxine in the morning but will hold on DOS since he normally takes with apple sauce.  Staff states mother will be available via phone for consent on DOS.

## 2025-04-16 PROBLEM — Q90.9 DOWN SYNDROME, UNSPECIFIED: Chronic | Status: ACTIVE | Noted: 2025-04-09

## 2025-04-16 PROBLEM — Z87.74 PERSONAL HISTORY OF (CORRECTED) CONGENITAL MALFORMATIONS OF HEART AND CIRCULATORY SYSTEM: Chronic | Status: ACTIVE | Noted: 2025-04-09

## 2025-04-24 ENCOUNTER — RESULT REVIEW (OUTPATIENT)
Age: 40
End: 2025-04-24

## 2025-04-24 ENCOUNTER — OUTPATIENT (OUTPATIENT)
Dept: OUTPATIENT SERVICES | Age: 40
LOS: 1 days | End: 2025-04-24

## 2025-04-24 ENCOUNTER — APPOINTMENT (OUTPATIENT)
Dept: CT IMAGING | Facility: HOSPITAL | Age: 40
End: 2025-04-24
Payer: COMMERCIAL

## 2025-04-24 ENCOUNTER — TRANSCRIPTION ENCOUNTER (OUTPATIENT)
Age: 40
End: 2025-04-24

## 2025-04-24 ENCOUNTER — APPOINTMENT (OUTPATIENT)
Dept: MRI IMAGING | Facility: HOSPITAL | Age: 40
End: 2025-04-24
Payer: COMMERCIAL

## 2025-04-24 VITALS
DIASTOLIC BLOOD PRESSURE: 64 MMHG | RESPIRATION RATE: 22 BRPM | HEART RATE: 65 BPM | OXYGEN SATURATION: 98 % | SYSTOLIC BLOOD PRESSURE: 118 MMHG

## 2025-04-24 VITALS
OXYGEN SATURATION: 100 % | RESPIRATION RATE: 18 BRPM | TEMPERATURE: 97 F | HEART RATE: 62 BPM | HEART RATE: 62 BPM | HEIGHT: 61.97 IN | DIASTOLIC BLOOD PRESSURE: 52 MMHG | WEIGHT: 123.02 LBS | SYSTOLIC BLOOD PRESSURE: 96 MMHG | RESPIRATION RATE: 18 BRPM | SYSTOLIC BLOOD PRESSURE: 96 MMHG | WEIGHT: 123.02 LBS | TEMPERATURE: 97 F | OXYGEN SATURATION: 100 % | DIASTOLIC BLOOD PRESSURE: 52 MMHG

## 2025-04-24 DIAGNOSIS — I37.1 NONRHEUMATIC PULMONARY VALVE INSUFFICIENCY: ICD-10-CM

## 2025-04-24 DIAGNOSIS — Z98.818 OTHER DENTAL PROCEDURE STATUS: Chronic | ICD-10-CM

## 2025-04-24 PROCEDURE — 71555 MRI ANGIO CHEST W OR W/O DYE: CPT | Mod: 26

## 2025-04-24 PROCEDURE — 75573 CT HRT C+ STRUX CGEN HRT DS: CPT | Mod: 26

## 2025-04-24 PROCEDURE — 75565 CARD MRI VELOC FLOW MAPPING: CPT | Mod: 26

## 2025-04-24 PROCEDURE — 75561 CARDIAC MRI FOR MORPH W/DYE: CPT | Mod: 26

## 2025-04-24 NOTE — ASU PREOP CHECKLIST - HAIR REMOVAL
hair removal not indicated

## 2025-04-24 NOTE — ASU DISCHARGE PLAN (ADULT/PEDIATRIC) - FINANCIAL ASSISTANCE
[facility] provides services at a reduced cost to those who are determined to be eligible through [facility]’s financial assistance program. Information regarding [facility]’s financial assistance program can be found by going to [email or web address] or by calling [phone].

## 2025-04-24 NOTE — ASU PREOP CHECKLIST - TEMPERATURE IN CELSIUS (DEGREES C)
27y female presented to the ED with complaints of panic attack. Pt endorses having a panic attack early this morning with, pt endorses hyperventilating with numbness and tingling in her hands and feet. During the episode she had thoughts of SI, but she splashed water on her face and the thoughts went away, no current thoughts of SI/HI at this time. Pt is 28 weeks pregnant with her second child. Pt endorses frequent urination, A&O4, ambulatory. 36

## 2025-04-24 NOTE — ASU DISCHARGE PLAN (ADULT/PEDIATRIC) - FINANCIAL ASSISTANCE
[facility] provides services at a reduced cost to those who are determined to be eligible through [facility]’s financial assistance program. Information regarding [facility]’s financial assistance program can be found by going to [email or web address] or by calling [phone].

## 2025-04-24 NOTE — ASU DISCHARGE PLAN (ADULT/PEDIATRIC) - CARE PROVIDER_API CALL
Pauly Blanco  Cardiovascular Disease  [address] [place] [address]  Phone: [phone]  Fax: [phone]  Follow Up Time:

## 2025-04-24 NOTE — ASU PREOP CHECKLIST - WARM FLUIDS/WARM BLANKETS
SUBJECTIVE:    Patient is a 61y old Male who presented with SOB and bilateral LE edema worse than usual. Pt has a hx of HTN, CAD s/p CABG x3, COPD, jameson's esophagus, reactive airway disease, DM2. Had an ankle injury in March and has been ambulating less since then, but is still ambulating with cane.   CTA showed bilateral PE and LL duplex showed DVTs.     Today is hospital day 2. This morning he is resting comfortably in bed and reports no new issues or overnight events. Eating/drinking ok. Denies SOB. O2 stat normal on RA.    PAST MEDICAL & SURGICAL HISTORY  SLE (systemic lupus erythematosus)  Thyroid nodule  Sjogrens syndrome  Obesity  Hiatal hernia with GERD  Murmur  COPD (chronic obstructive pulmonary disease): post 911  Asthma: stable post 911  Obstructive sleep apnea on CPAP  Diabetes  HTN (hypertension)  Myocardial infarction: 2015  CAD (coronary artery disease) of bypass graft: 2015 x 3 vd  Arthritis: ra hands knees  History of surgery: 2015 x 3 v  History of surgery: 1990- rct sx    SOCIAL HISTORY:  Former smoker >40 years ago    ALLERGIES:  Mushrooms (Other (Severe))  No Known Drug Allergies    MEDICATIONS:  STANDING MEDICATIONS  ALBUTerol/ipratropium for Nebulization 3 milliLiter(s) Nebulizer every 4 hours  amLODIPine   Tablet 10 milliGRAM(s) Oral daily  apixaban 10 milliGRAM(s) Oral every 12 hours  aspirin enteric coated 81 milliGRAM(s) Oral daily  buDESOnide 160 MICROgram(s)/formoterol 4.5 MICROgram(s) Inhaler 2 Puff(s) Inhalation two times a day  chlorhexidine 4% Liquid 1 Application(s) Topical <User Schedule>  cloNIDine 0.2 milliGRAM(s) Oral three times a day  dextrose 5%. 1000 milliLiter(s) IV Continuous <Continuous>  dextrose 50% Injectable 12.5 Gram(s) IV Push once  dextrose 50% Injectable 25 Gram(s) IV Push once  dextrose 50% Injectable 25 Gram(s) IV Push once  furosemide    Tablet 40 milliGRAM(s) Oral two times a day  hydrALAZINE 25 milliGRAM(s) Oral three times a day  influenza   Vaccine 0.5 milliLiter(s) IntraMuscular once  insulin glargine Injectable (LANTUS) 30 Unit(s) SubCutaneous every morning  insulin glargine Injectable (LANTUS) 30 Unit(s) SubCutaneous at bedtime  insulin lispro (HumaLOG) corrective regimen sliding scale   SubCutaneous three times a day before meals  insulin lispro Injectable (HumaLOG) 15 Unit(s) SubCutaneous three times a day before meals  lisinopril 40 milliGRAM(s) Oral daily  metoprolol tartrate 25 milliGRAM(s) Oral two times a day  potassium chloride    Tablet ER 40 milliEquivalent(s) Oral every 4 hours    PRN MEDICATIONS  ALBUTerol/ipratropium for Nebulization 3 milliLiter(s) Nebulizer every 4 hours PRN  dextrose 40% Gel 15 Gram(s) Oral once PRN  glucagon  Injectable 1 milliGRAM(s) IntraMuscular once PRN  oxyCODONE    5 mG/acetaminophen 325 mG 1 Tablet(s) Oral every 6 hours PRN    VITALS:   T(F): 96.9  HR: 72  BP: 94/61  RR: 18  SpO2: 90%    LABS:                        14.5   13.14 )-----------( 234      ( 14 Sep 2019 04:57 )             43.5     09-14    136  |  96<L>  |  23<H>  ----------------------------<  106<H>  3.2<L>   |  25  |  0.9    Ca    8.9      14 Sep 2019 04:57  Phos  2.7     09-13  Mg     2.3     09-13    TPro  5.7<L>  /  Alb  3.1<L>  /  TBili  0.9  /  DBili  x   /  AST  15  /  ALT  43<H>  /  AlkPhos  53  09-14    PT/INR - ( 14 Sep 2019 04:57 )   PT: 12.20 sec;   INR: 1.06 ratio         PTT - ( 14 Sep 2019 04:57 )  PTT:30.8 sec          CARDIAC MARKERS ( 13 Sep 2019 06:35 )  x     / 0.15 ng/mL / x     / x     / x      CARDIAC MARKERS ( 12 Sep 2019 21:04 )  x     / 0.15 ng/mL / x     / x     / x          RADIOLOGY:  Chest XR 9/14/19:  "No radiographic evidence of acute cardiopulmonary disease. Stable   cardiomegaly"    PHYSICAL EXAM:  Vital Signs: I have reviewed the initial vital signs.  Constitutional: well-nourished, appears stated age, no acute distress  Cardiovascular: regular rate, regular rhythm, well-perfused extremities  Respiratory: unlabored respiratory effort, clear to auscultation bilaterally  Gastrointestinal: soft, non-tender abdomen  Musculoskeletal: Bilateral LE edema   Neurologic: awake, alert, extremities’ motor and sensory functions grossly intact no

## 2025-04-24 NOTE — ASU PATIENT PROFILE, ADULT - NSICDXPASTMEDICALHX_GEN_ALL_CORE_FT
PAST MEDICAL HISTORY:  Affective Bipolar Disorder     Dental caries     Down Syndrome     GERD (gastroesophageal reflux disease)     High myopia     History of Hypothyroidism     History of tetralogy of Fallot     HLD (hyperlipidemia)     Hypothyroidism     Intellectual disability     Periodontal disease     Pulmonary valve insufficiency     Trisomy 21

## 2025-04-24 NOTE — ASU PATIENT PROFILE, ADULT - FALL HARM RISK - RISK INTERVENTIONS
Communicate Fall Risk and Risk Factors to all staff, patient, and family/Monitor for mental status changes/Reinforce activity limits and safety measures with patient and family/Reorient to person, place and time as needed/Call bell, personal items and telephone in reach/Instruct patient to call for assistance before getting out of bed or chair/Non-slip footwear when patient is out of bed/[place] to call system/Physically safe environment - no spills, clutter or unnecessary equipment/Purposeful Proactive Rounding/Room/bathroom lighting operational, light cord in reach/Unable to comprehend

## 2025-04-24 NOTE — ASU DISCHARGE PLAN (ADULT/PEDIATRIC) - NS MD DC FALL RISK RISK
For information on Fall & Injury Prevention, visit: [email or web address] OR  [email or web address] OR  https://www.cdc.gov/steadi/patient.html

## 2025-04-24 NOTE — ASU PATIENT PROFILE, ADULT - VISION (WITH CORRECTIVE LENSES IF THE PATIENT USUALLY WEARS THEM):
Normal vision: sees adequately in most situations; can see medication labels, newsprint [facility]

## 2025-04-24 NOTE — ASU PATIENT PROFILE, ADULT - FALL HARM RISK - RISK INTERVENTIONS
Assistance OOB with selected safe patient handling equipment/Communicate Fall Risk and Risk Factors to all staff, patient, and family/Monitor for mental status changes/Move patient closer to nurses' station/Reinforce activity limits and safety measures with patient and family/Reorient to person, place and time as needed/Toileting schedule using arm’s reach rule for commode and bathroom/Use of alarms - bed, chair and/or voice tab/Visual Cue: Yellow wristband/Bed in lowest position, wheels locked, appropriate side rails in place/Call bell, personal items and telephone in reach/Instruct patient to call for assistance before getting out of bed or chair/Non-slip footwear when patient is out of bed/[place] to call system/Physically safe environment - no spills, clutter or unnecessary equipment/Purposeful Proactive Rounding/Room/bathroom lighting operational, light cord in reach

## 2025-04-24 NOTE — ASU DISCHARGE PLAN (ADULT/PEDIATRIC) - NS MD DC FALL RISK RISK
For information on Fall & Injury Prevention, visit: [email or web address] OR  [email or web address] OR  https://www.cdc.gov/steadi/patient.html

## 2025-04-24 NOTE — ASU PREOP CHECKLIST - AS TEMP SITE
· Continue losartan 50 mg daily with hold parameters  · Monitor blood pressure per protocol forehead

## 2025-04-24 NOTE — ASU DISCHARGE PLAN (ADULT/PEDIATRIC) - CARE PROVIDER_API CALL
Pauly Blanco  Cardiovascular Disease  [address] [place] [address]  Phone: [phone]  Fax: [phone]  Follow Up Time:

## 2025-04-30 ENCOUNTER — OUTPATIENT (OUTPATIENT)
Dept: OUTPATIENT SERVICES | Facility: HOSPITAL | Age: 40
LOS: 1 days | End: 2025-04-30
Payer: MEDICARE

## 2025-04-30 VITALS — WEIGHT: 121.92 LBS | HEIGHT: 62.2 IN

## 2025-04-30 DIAGNOSIS — I37.1 NONRHEUMATIC PULMONARY VALVE INSUFFICIENCY: ICD-10-CM

## 2025-04-30 DIAGNOSIS — K02.62 DENTAL CARIES ON SMOOTH SURFACE PENETRATING INTO DENTIN: ICD-10-CM

## 2025-04-30 DIAGNOSIS — Z01.818 ENCOUNTER FOR OTHER PREPROCEDURAL EXAMINATION: ICD-10-CM

## 2025-04-30 DIAGNOSIS — Z98.818 OTHER DENTAL PROCEDURE STATUS: Chronic | ICD-10-CM

## 2025-04-30 PROCEDURE — G0463: CPT

## 2025-04-30 RX ORDER — STANNOUS FLUORIDE 0.4 %
1 GEL (GRAM) DENTAL
Refills: 0 | DISCHARGE

## 2025-04-30 RX ORDER — IPRATROPIUM BROMIDE 42 UG/1
2 SPRAY NASAL
Refills: 0 | DISCHARGE

## 2025-04-30 RX ORDER — LORATADINE 5 MG/5ML
1 SOLUTION ORAL
Refills: 0 | DISCHARGE

## 2025-04-30 RX ORDER — FLUTICASONE PROPIONATE 50 UG/1
2 SPRAY, METERED NASAL
Refills: 0 | DISCHARGE

## 2025-04-30 RX ORDER — CLINDAMYCIN PHOSPHATE 150 MG/ML
900 VIAL (ML) INJECTION ONCE
Refills: 0 | Status: DISCONTINUED | OUTPATIENT
Start: 2025-05-21 | End: 2025-06-04

## 2025-04-30 RX ORDER — ERYTHROMYCIN BASE IN ETHANOL 2 %
1 SOLUTION, NON-ORAL TOPICAL
Refills: 0 | DISCHARGE

## 2025-04-30 RX ORDER — B1/B2/B3/B5/B6/B12/VIT C/FOLIC 500-0.5 MG
1 TABLET ORAL
Refills: 0 | DISCHARGE

## 2025-04-30 RX ORDER — LEVOTHYROXINE SODIUM 300 MCG
1 TABLET ORAL
Refills: 0 | DISCHARGE

## 2025-04-30 RX ORDER — GENTAMICIN SULFATE 40 MG/ML
280 VIAL (ML) INJECTION ONCE
Refills: 0 | Status: DISCONTINUED | OUTPATIENT
Start: 2025-05-21 | End: 2025-06-04

## 2025-04-30 RX ORDER — CLINDAMYCIN PHOSPHATE 150 MG/ML
2 VIAL (ML) INJECTION
Refills: 0 | DISCHARGE

## 2025-04-30 RX ORDER — OMEGA-3-ACID ETHYL ESTERS CAPSULES 1 G/1
1 CAPSULE, LIQUID FILLED ORAL
Refills: 0 | DISCHARGE

## 2025-04-30 RX ORDER — ACETAMINOPHEN 500 MG/5ML
10 LIQUID (ML) ORAL
Refills: 0 | DISCHARGE

## 2025-04-30 RX ORDER — LACTOBACILLUS ACIDOPHILUS/PECT 75 MM-100
2 CAPSULE ORAL
Refills: 0 | DISCHARGE

## 2025-04-30 RX ORDER — CALCIUM CARBONATE/VITAMIN D3 500MG-5MCG
1 TABLET ORAL
Refills: 0 | DISCHARGE

## 2025-04-30 RX ORDER — QUETIAPINE FUMARATE 25 MG/1
1 TABLET ORAL
Refills: 0 | DISCHARGE

## 2025-04-30 RX ORDER — ATORVASTATIN CALCIUM 80 MG/1
1 TABLET, FILM COATED ORAL
Refills: 0 | DISCHARGE

## 2025-04-30 NOTE — H&P PST ADULT - PROBLEM SELECTOR PROBLEM 1
Schedule for [facility] in 8/2021  Start on AUGMENTIN for 10 days  
Dental caries on smooth surface penetrating into dentin

## 2025-04-30 NOTE — H&P PST ADULT - NSICDXPASTSURGICALHX_GEN_ALL_CORE_FT
PAST SURGICAL HISTORY:  History of pulmonic valve replacement     Other dental procedure status     Tetralogy of Fallot s/p repair long time ago

## 2025-04-30 NOTE — H&P PST ADULT - HISTORY OF PRESENT ILLNESS
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  His last cardiac MRI in 2013 revealed an RVEF of 46%, RVEDV of 318 ml, and RVEDVi of 191 ml/m2.  His last TTE from 2020 showed severe TR with RV dilation and RV hypertrophy. Biventricular function appears preserved.  ?  Satish comes today with an aide from his residential facility. According to the aide who accompanied him today, Satish's energy remains unchanged from his last visit. He does not tire easily and can walk for extended periods of time and a flight of stairs with assistance. He has not had fast or heavy breathing, or syncope.  ?  He is nonverbal and requires full assistance with ADLs.  ?  TTE today shows again severe TR and RV hypertrophy, likely slightly worse from what was seen in 2020.  Satish did not allow an EKG to be obtained today.    Miriam Sanders (Mother)  [phone]; [phone]    Resides at [facility] [place] in [place]  [phone]  Darya Becerra (Medical Staff)  Hailey (RN) [phone] 40 year old male with PMH hypothyroidism, severe developmental delay due to Trisomy 21, tetralogy of Fallot s/p transannular patch repair (in infancy), HLD, hypothyroidism and pulmonic valve insufficiency s/p pulmonic valve replacement (about 1.5 weeks ago, unsure of where procedure was performed) and dental caries presents to PST for scheduled comprehensive dental procedure under anesthesia with Dr. Aguiar.     Legal Guardian  Miriam Sanders (Mother)  [phone]; [phone]    Resides at [facility] in [place]  [phone]  Darya Becerra (Medical Staff)  Hailey (RN) [phone]

## 2025-04-30 NOTE — H&P PST ADULT - COMMENTS
unable to assess vital signs, patient very aggressive at PST and unable to sit still to obtain vital signs and was constantly trying to remove pulse oximeter and blood pressure cuff, Dr. Aguiar and Anesthesia team notified via email

## 2025-04-30 NOTE — H&P PST ADULT - GASTROINTESTINAL
negative normal/soft/nontender/nondistended/normal active bowel sounds normal/soft/nontender/normal active bowel sounds

## 2025-04-30 NOTE — H&P PST ADULT - MENTAL STATUS
A&Ox0, nonverbal, does not follow commands A&Ox0, nonverbal, does not follow commands  aggressive at PST  restlessness

## 2025-04-30 NOTE — H&P PST ADULT - MUSCULOSKELETAL
normal/ROM intact/normal gait/strength 5/5 bilateral upper extremities/strength 5/5 bilateral lower extremities negative normal gait

## 2025-04-30 NOTE — H&P PST ADULT - ASSESSMENT
DASI score: 3.97  DASI activity: Able to walk 2-3 blocks, attends day program M-F, assistance with ADLs, 1 Flight of Stairs, able to feed himself with supervision, uses restroom at the house, assistance with getting dressed  Loose teeth or denture: denies, dental caries

## 2025-04-30 NOTE — H&P PST ADULT - PROBLEM SELECTOR PLAN 2
Pending cardiac eval, appt TBD  Will obtain most recent echo results   As per group home staff, pt is not on any anticoagulants for pulmonic heart valve replacement

## 2025-04-30 NOTE — H&P PST ADULT - PROBLEM SELECTOR PLAN 1
Pt is scheduled for a comprehensive dental procedure under anesthesia with Dr. Aguiar on 5/21/25  Unable to obtain CBC, BMP at PST visit, pt unsettled, restless and agitated. Group home staff instructed to obtain preop blood work with PCP or cardiologist with teach back understanding  Pending M/E, appt TBD

## 2025-04-30 NOTE — H&P PST ADULT - NS MD HP INPLANTS MED DEV
pulmonic heart valve pulmonic heart valve, tetrology of fallot annular patch pulmonic heart valve, tetrology of fallot patch repair

## 2025-05-05 ENCOUNTER — NON-APPOINTMENT (OUTPATIENT)
Age: 40
End: 2025-05-05

## 2025-05-05 ENCOUNTER — RESULT CHARGE (OUTPATIENT)
Age: 40
End: 2025-05-05

## 2025-05-05 ENCOUNTER — APPOINTMENT (OUTPATIENT)
Dept: PEDIATRIC CARDIOLOGY | Facility: CLINIC | Age: 40
End: 2025-05-05
Payer: MEDICARE

## 2025-05-05 VITALS
SYSTOLIC BLOOD PRESSURE: 90 MMHG | OXYGEN SATURATION: 97 % | BODY MASS INDEX: 23.35 KG/M2 | DIASTOLIC BLOOD PRESSURE: 52 MMHG | WEIGHT: 123.68 LBS | HEIGHT: 61.22 IN | HEART RATE: 77 BPM

## 2025-05-05 PROCEDURE — 99214 OFFICE O/P EST MOD 30 MIN: CPT

## 2025-05-05 RX ORDER — QUETIAPINE 25 MG/1
25 TABLET, FILM COATED ORAL
Refills: 0 | Status: ACTIVE | COMMUNITY
Start: 2025-05-05

## 2025-05-05 RX ORDER — PROBIOTIC PRODUCT - TAB 1B-250 MG
TAB ORAL
Refills: 0 | Status: ACTIVE | COMMUNITY
Start: 2025-05-05

## 2025-05-05 RX ORDER — ATORVASTATIN CALCIUM 10 MG/1
10 TABLET, FILM COATED ORAL
Refills: 0 | Status: ACTIVE | COMMUNITY
Start: 2025-05-05

## 2025-05-07 ENCOUNTER — NON-APPOINTMENT (OUTPATIENT)
Age: 40
End: 2025-05-07

## 2025-05-09 ENCOUNTER — APPOINTMENT (OUTPATIENT)
Dept: PEDIATRIC CARDIOLOGY | Facility: CLINIC | Age: 40
End: 2025-05-09

## 2025-05-09 PROCEDURE — 99212 OFFICE O/P EST SF 10 MIN: CPT | Mod: 93

## 2025-05-21 ENCOUNTER — TRANSCRIPTION ENCOUNTER (OUTPATIENT)
Age: 40
End: 2025-05-21

## 2025-05-21 ENCOUNTER — OUTPATIENT (OUTPATIENT)
Dept: OUTPATIENT SERVICES | Facility: HOSPITAL | Age: 40
LOS: 1 days | End: 2025-05-21
Payer: MEDICARE

## 2025-05-21 VITALS
RESPIRATION RATE: 16 BRPM | WEIGHT: 121.25 LBS | OXYGEN SATURATION: 100 % | DIASTOLIC BLOOD PRESSURE: 72 MMHG | HEART RATE: 61 BPM | SYSTOLIC BLOOD PRESSURE: 107 MMHG | TEMPERATURE: 97 F | HEIGHT: 62.2 IN

## 2025-05-21 VITALS
OXYGEN SATURATION: 95 % | HEART RATE: 58 BPM | TEMPERATURE: 97 F | RESPIRATION RATE: 17 BRPM | SYSTOLIC BLOOD PRESSURE: 116 MMHG | DIASTOLIC BLOOD PRESSURE: 72 MMHG

## 2025-05-21 DIAGNOSIS — Z98.818 OTHER DENTAL PROCEDURE STATUS: Chronic | ICD-10-CM

## 2025-05-21 DIAGNOSIS — K02.62 DENTAL CARIES ON SMOOTH SURFACE PENETRATING INTO DENTIN: ICD-10-CM

## 2025-05-21 LAB
ANION GAP SERPL CALC-SCNC: 14 MMOL/L — SIGNIFICANT CHANGE UP (ref 5–17)
BUN SERPL-MCNC: 15 MG/DL — SIGNIFICANT CHANGE UP (ref 7–23)
CALCIUM SERPL-MCNC: 8.9 MG/DL — SIGNIFICANT CHANGE UP (ref 8.4–10.5)
CHLORIDE SERPL-SCNC: 107 MMOL/L — SIGNIFICANT CHANGE UP (ref 96–108)
CO2 SERPL-SCNC: 15 MMOL/L — LOW (ref 22–31)
CREAT SERPL-MCNC: 0.68 MG/DL — SIGNIFICANT CHANGE UP (ref 0.5–1.3)
EGFR: 121 ML/MIN/1.73M2 — SIGNIFICANT CHANGE UP
EGFR: 121 ML/MIN/1.73M2 — SIGNIFICANT CHANGE UP
GLUCOSE SERPL-MCNC: 89 MG/DL — SIGNIFICANT CHANGE UP (ref 70–99)
HCT VFR BLD CALC: 41.2 % — SIGNIFICANT CHANGE UP (ref 39–50)
HGB BLD-MCNC: 13.9 G/DL — SIGNIFICANT CHANGE UP (ref 13–17)
MCHC RBC-ENTMCNC: 32 PG — SIGNIFICANT CHANGE UP (ref 27–34)
MCHC RBC-ENTMCNC: 33.7 G/DL — SIGNIFICANT CHANGE UP (ref 32–36)
MCV RBC AUTO: 94.7 FL — SIGNIFICANT CHANGE UP (ref 80–100)
NRBC BLD AUTO-RTO: 0 /100 WBCS — SIGNIFICANT CHANGE UP (ref 0–0)
PLATELET # BLD AUTO: 199 K/UL — SIGNIFICANT CHANGE UP (ref 150–400)
POTASSIUM SERPL-MCNC: 4.7 MMOL/L — SIGNIFICANT CHANGE UP (ref 3.5–5.3)
POTASSIUM SERPL-SCNC: 4.7 MMOL/L — SIGNIFICANT CHANGE UP (ref 3.5–5.3)
RBC # BLD: 4.35 M/UL — SIGNIFICANT CHANGE UP (ref 4.2–5.8)
RBC # FLD: 13.8 % — SIGNIFICANT CHANGE UP (ref 10.3–14.5)
SODIUM SERPL-SCNC: 136 MMOL/L — SIGNIFICANT CHANGE UP (ref 135–145)
WBC # BLD: 4.5 K/UL — SIGNIFICANT CHANGE UP (ref 3.8–10.5)
WBC # FLD AUTO: 4.5 K/UL — SIGNIFICANT CHANGE UP (ref 3.8–10.5)

## 2025-05-21 PROCEDURE — D4341: CPT

## 2025-05-21 PROCEDURE — C1889: CPT

## 2025-05-21 PROCEDURE — D4210: CPT

## 2025-05-21 PROCEDURE — 80048 BASIC METABOLIC PNL TOTAL CA: CPT

## 2025-05-21 PROCEDURE — D7210: CPT

## 2025-05-21 PROCEDURE — 85027 COMPLETE CBC AUTOMATED: CPT

## 2025-05-21 PROCEDURE — D7971: CPT

## 2025-05-21 PROCEDURE — 36415 COLL VENOUS BLD VENIPUNCTURE: CPT

## 2025-05-21 PROCEDURE — C9399: CPT

## 2025-05-21 PROCEDURE — D1110: CPT

## 2025-05-21 DEVICE — SURGICEL 2 X 14": Type: IMPLANTABLE DEVICE | Status: FUNCTIONAL

## 2025-05-21 RX ORDER — FENTANYL CITRATE-0.9 % NACL/PF 100MCG/2ML
25 SYRINGE (ML) INTRAVENOUS
Refills: 0 | Status: DISCONTINUED | OUTPATIENT
Start: 2025-05-21 | End: 2025-05-21

## 2025-05-21 RX ORDER — ONDANSETRON HCL/PF 4 MG/2 ML
4 VIAL (ML) INJECTION ONCE
Refills: 0 | Status: DISCONTINUED | OUTPATIENT
Start: 2025-05-21 | End: 2025-05-21

## 2025-05-21 RX ORDER — LIDOCAINE HCL/PF 10 MG/ML
0.2 VIAL (ML) INJECTION ONCE
Refills: 0 | Status: DISCONTINUED | OUTPATIENT
Start: 2025-05-21 | End: 2025-05-21

## 2025-05-21 RX ORDER — ACETAMINOPHEN 500 MG/5ML
1000 LIQUID (ML) ORAL ONCE
Refills: 0 | Status: DISCONTINUED | OUTPATIENT
Start: 2025-05-21 | End: 2025-05-21

## 2025-05-21 NOTE — ASU DISCHARGE PLAN (ADULT/PEDIATRIC) - ASU DC SPECIAL INSTRUCTIONSFT
comprehensive dental treatment under general anesthesia, exam, xrays, cleaning, periodontal treatment and fluoride and one extraction of one wisdom tooth performed to the lower right side, no straw for two days and keep head elevated for the next two days and nights     tylenol prn pain and give him tylenol for the next two days for comfort     see DR Aguiar in one week, appointment will be at [facility] [phone] on 5/29 at 9:30 am appt is set    resume all medications    return to routine activities tomorrow if patient feels well    ice to the lower right as tolerated 20 minutes on and off today     if there is any excess bleeding apply pressure with gauze 20 minutes

## 2025-05-21 NOTE — ASU DISCHARGE PLAN (ADULT/PEDIATRIC) - FINANCIAL ASSISTANCE
[facility] provides services at a reduced cost to those who are determined to be eligible through [facility]’s financial assistance program. Information regarding [facility]’s financial assistance program can be found by going to [email or web address] or by calling [phone].

## 2025-05-21 NOTE — PACU DISCHARGE NOTE - NS MD DISCHARGE NOTE DISCHARGE
Home
Vaccinations/[facility]  Screening Program/  Immunization Record/Breastfeeding Log/Breastfeeding Mother’s Support Group Information/Guide to Postpartum Care/[facility] Hearing Screen Program/Birth Certificate Instructions

## 2025-05-21 NOTE — ASU PATIENT PROFILE, ADULT - FALL HARM RISK - HARM RISK INTERVENTIONS
Assistance with ambulation/Assistance OOB with selected safe patient handling equipment/Communicate Risk of Fall with Harm to all staff/Discuss with provider need for PT consult/Monitor for mental status changes/Monitor gait and stability/Move patient closer to nurses' station/Reinforce activity limits and safety measures with patient and family/Reorient to person, place and time as needed/Tailored Fall Risk Interventions/Toileting schedule using arm’s reach rule for commode and bathroom/Use of alarms - bed, chair and/or voice tab/Visual Cue: Yellow wristband and red socks/Bed in lowest position, wheels locked, appropriate side rails in place/Call bell, personal items and telephone in reach/Instruct patient to call for assistance before getting out of bed or chair/Non-slip footwear when patient is out of bed/[place] to call system/Physically safe environment - no spills, clutter or unnecessary equipment/Purposeful Proactive Rounding/Room/bathroom lighting operational, light cord in reach

## 2025-07-16 ENCOUNTER — APPOINTMENT (OUTPATIENT)
Dept: PEDIATRIC CARDIOLOGY | Facility: CLINIC | Age: 40
End: 2025-07-16
Payer: COMMERCIAL

## 2025-07-16 PROCEDURE — 99214 OFFICE O/P EST MOD 30 MIN: CPT | Mod: 2W

## 2025-08-07 ENCOUNTER — APPOINTMENT (OUTPATIENT)
Dept: ELECTROPHYSIOLOGY | Facility: CLINIC | Age: 40
End: 2025-08-07
Payer: COMMERCIAL

## 2025-08-07 VITALS
DIASTOLIC BLOOD PRESSURE: 66 MMHG | OXYGEN SATURATION: 97 % | HEART RATE: 65 BPM | SYSTOLIC BLOOD PRESSURE: 108 MMHG | RESPIRATION RATE: 16 BRPM

## 2025-08-07 DIAGNOSIS — Q21.3 TETRALOGY OF FALLOT: ICD-10-CM

## 2025-08-07 DIAGNOSIS — I37.1 NONRHEUMATIC PULMONARY VALVE INSUFFICIENCY: ICD-10-CM

## 2025-08-07 PROCEDURE — G2211 COMPLEX E/M VISIT ADD ON: CPT

## 2025-08-07 PROCEDURE — 99204 OFFICE O/P NEW MOD 45 MIN: CPT

## 2025-08-27 ENCOUNTER — OUTPATIENT (OUTPATIENT)
Dept: OUTPATIENT SERVICES | Age: 40
LOS: 1 days | End: 2025-08-27

## 2025-08-27 VITALS — WEIGHT: 129.19 LBS | HEIGHT: 61.42 IN

## 2025-08-27 VITALS
SYSTOLIC BLOOD PRESSURE: 131 MMHG | TEMPERATURE: 98 F | HEART RATE: 110 BPM | WEIGHT: 129.19 LBS | OXYGEN SATURATION: 98 % | RESPIRATION RATE: 18 BRPM | HEIGHT: 61.42 IN | DIASTOLIC BLOOD PRESSURE: 87 MMHG

## 2025-08-27 DIAGNOSIS — E03.9 HYPOTHYROIDISM, UNSPECIFIED: ICD-10-CM

## 2025-08-27 DIAGNOSIS — Q90.9 DOWN SYNDROME, UNSPECIFIED: ICD-10-CM

## 2025-08-27 DIAGNOSIS — Q21.3 TETRALOGY OF FALLOT: ICD-10-CM

## 2025-08-27 DIAGNOSIS — Z98.818 OTHER DENTAL PROCEDURE STATUS: Chronic | ICD-10-CM

## 2025-08-27 DIAGNOSIS — I37.1 NONRHEUMATIC PULMONARY VALVE INSUFFICIENCY: ICD-10-CM

## 2025-08-27 DIAGNOSIS — F84.0 AUTISTIC DISORDER: ICD-10-CM

## 2025-08-27 LAB
ANION GAP SERPL CALC-SCNC: 13 MMOL/L — SIGNIFICANT CHANGE UP (ref 7–14)
BLD GP AB SCN SERPL QL: NEGATIVE — SIGNIFICANT CHANGE UP
BUN SERPL-MCNC: 16 MG/DL — SIGNIFICANT CHANGE UP (ref 7–23)
CALCIUM SERPL-MCNC: 9.4 MG/DL — SIGNIFICANT CHANGE UP (ref 8.4–10.5)
CHLORIDE SERPL-SCNC: 102 MMOL/L — SIGNIFICANT CHANGE UP (ref 98–107)
CO2 SERPL-SCNC: 24 MMOL/L — SIGNIFICANT CHANGE UP (ref 22–31)
CREAT SERPL-MCNC: 0.76 MG/DL — SIGNIFICANT CHANGE UP (ref 0.5–1.3)
EGFR: 117 ML/MIN/1.73M2 — SIGNIFICANT CHANGE UP
EGFR: 117 ML/MIN/1.73M2 — SIGNIFICANT CHANGE UP
GLUCOSE SERPL-MCNC: 100 MG/DL — HIGH (ref 70–99)
HCT VFR BLD CALC: 41.6 % — SIGNIFICANT CHANGE UP (ref 39–50)
HGB BLD-MCNC: 14.6 G/DL — SIGNIFICANT CHANGE UP (ref 13–17)
MCHC RBC-ENTMCNC: 32.5 PG — SIGNIFICANT CHANGE UP (ref 27–34)
MCHC RBC-ENTMCNC: 35.1 G/DL — SIGNIFICANT CHANGE UP (ref 32–36)
MCV RBC AUTO: 92.7 FL — SIGNIFICANT CHANGE UP (ref 80–100)
NRBC # BLD AUTO: 0 K/UL — SIGNIFICANT CHANGE UP (ref 0–0)
NRBC # FLD: 0 K/UL — SIGNIFICANT CHANGE UP (ref 0–0)
NRBC BLD AUTO-RTO: 0 /100 WBCS — SIGNIFICANT CHANGE UP (ref 0–0)
PLATELET # BLD AUTO: 201 K/UL — SIGNIFICANT CHANGE UP (ref 150–400)
PMV BLD: 8.9 FL — SIGNIFICANT CHANGE UP (ref 7–13)
POTASSIUM SERPL-MCNC: 4.3 MMOL/L — SIGNIFICANT CHANGE UP (ref 3.5–5.3)
POTASSIUM SERPL-SCNC: 4.3 MMOL/L — SIGNIFICANT CHANGE UP (ref 3.5–5.3)
RBC # BLD: 4.49 M/UL — SIGNIFICANT CHANGE UP (ref 4.2–5.8)
RBC # FLD: 13.1 % — SIGNIFICANT CHANGE UP (ref 10.3–14.5)
RH IG SCN BLD-IMP: POSITIVE — SIGNIFICANT CHANGE UP
SODIUM SERPL-SCNC: 139 MMOL/L — SIGNIFICANT CHANGE UP (ref 135–145)
T3 SERPL-MCNC: 90 NG/DL — SIGNIFICANT CHANGE UP (ref 80–200)
T4 AB SER-ACNC: 5.88 UG/DL — SIGNIFICANT CHANGE UP (ref 5.1–13)
T4 FREE SERPL-MCNC: 1.1 NG/DL — SIGNIFICANT CHANGE UP (ref 0.9–1.8)
TSH SERPL-MCNC: 0.65 UIU/ML — SIGNIFICANT CHANGE UP (ref 0.27–4.2)
WBC # BLD: 5.15 K/UL — SIGNIFICANT CHANGE UP (ref 3.8–10.5)
WBC # FLD AUTO: 5.15 K/UL — SIGNIFICANT CHANGE UP (ref 3.8–10.5)

## 2025-08-27 RX ORDER — FLUTICASONE PROPIONATE 220 UG/1
12 AEROSOL, METERED RESPIRATORY (INHALATION)
Refills: 0 | DISCHARGE

## 2025-08-27 RX ORDER — IPRATROPIUM BROMIDE 42 UG/1
2 SPRAY NASAL
Refills: 0 | DISCHARGE

## (undated) DEVICE — ELCTR BOVIE PENCIL SMOKE EVACUATION

## (undated) DEVICE — PACK DENTAL

## (undated) DEVICE — DRAPE SPLIT SHEET 77" X 120"

## (undated) DEVICE — SUCTION YANKAUER OPEN TIP NO VENT CURVE

## (undated) DEVICE — TUBING SUCTION NONCONDUCTIVE 6MM X 12FT

## (undated) DEVICE — TONSIL ROLLS

## (undated) DEVICE — PACKING GAUZE PLAIN 1"

## (undated) DEVICE — DRSG CURITY GAUZE SPONGE 4 X 4" 12-PLY

## (undated) DEVICE — DRAPE LIGHT HANDLE COVER (BLUE)

## (undated) DEVICE — DRAPE 3/4 SHEET W REINFORCEMENT 56X77"

## (undated) DEVICE — ELCTR GROUNDING PAD ADULT COVIDIEN

## (undated) DEVICE — POSITIONER FOAM HEAD CRADLE (PINK)

## (undated) DEVICE — DRAPE INSTRUMENT POUCH 6.75" X 11"

## (undated) DEVICE — VISITEC 4X4

## (undated) DEVICE — DRAPE CAMERA ENDOMATE

## (undated) DEVICE — SPECIMEN CONTAINER 100ML

## (undated) DEVICE — DRAPE LIGHT HANDLE COVER (GREEN)

## (undated) DEVICE — DRAPE 3/4 SHEET 52X76"

## (undated) DEVICE — VAGINAL PACKING 2"

## (undated) DEVICE — VENODYNE/SCD SLEEVE CALF MEDIUM

## (undated) DEVICE — DRAPE MAGNETIC INSTRUMENT MEDIUM

## (undated) DEVICE — DRAPE MAYO STAND 30"

## (undated) DEVICE — LAP PAD 18 X 18"

## (undated) DEVICE — BASIN SET DOUBLE

## (undated) DEVICE — SOL IRR POUR NS 0.9% 500ML

## (undated) DEVICE — MEDICATION LABELS W MARKER

## (undated) DEVICE — POSITIONER FOAM EGG CRATE ULNAR 2PCS (PINK)

## (undated) DEVICE — SOL IRR POUR H2O 250ML

## (undated) DEVICE — GLV 7 PROTEXIS (WHITE)

## (undated) DEVICE — WARMING BLANKET LOWER ADULT

## (undated) DEVICE — GLV 6.5 PROTEXIS (WHITE)

## (undated) DEVICE — GLV 7.5 PROTEXIS (WHITE)

## (undated) DEVICE — GOWN TRIMAX LG

## (undated) DEVICE — POOLE SUCTION TIP

## (undated) DEVICE — DRAPE SURGICAL #1010

## (undated) DEVICE — GOWN XL

## (undated) DEVICE — SYR ASEPTO

## (undated) DEVICE — LABELS BLANK W PEN

## (undated) DEVICE — VAGINAL PACKING 2 X 6"

## (undated) DEVICE — DRAPE TOWEL BLUE STICKY

## (undated) DEVICE — TUBING SUCTION 20FT